# Patient Record
Sex: MALE | Race: WHITE | NOT HISPANIC OR LATINO | Employment: FULL TIME | URBAN - METROPOLITAN AREA
[De-identification: names, ages, dates, MRNs, and addresses within clinical notes are randomized per-mention and may not be internally consistent; named-entity substitution may affect disease eponyms.]

---

## 2017-01-04 ENCOUNTER — OFFICE VISIT (OUTPATIENT)
Dept: URGENT CARE | Facility: PHYSICIAN GROUP | Age: 37
End: 2017-01-04

## 2017-01-04 VITALS
HEIGHT: 69 IN | TEMPERATURE: 98.6 F | HEART RATE: 64 BPM | WEIGHT: 177 LBS | BODY MASS INDEX: 26.22 KG/M2 | RESPIRATION RATE: 18 BRPM | SYSTOLIC BLOOD PRESSURE: 126 MMHG | OXYGEN SATURATION: 96 % | DIASTOLIC BLOOD PRESSURE: 68 MMHG

## 2017-01-04 DIAGNOSIS — Z02.4 DRIVER'S PERMIT PHYSICAL EXAMINATION: ICD-10-CM

## 2017-01-04 PROCEDURE — 99406 BEHAV CHNG SMOKING 3-10 MIN: CPT | Performed by: NURSE PRACTITIONER

## 2017-01-04 PROCEDURE — 7100 PR DOT PHYSICAL: Performed by: NURSE PRACTITIONER

## 2017-01-04 RX ORDER — IBUPROFEN 200 MG
200 TABLET ORAL EVERY 6 HOURS PRN
COMMUNITY
End: 2019-01-28

## 2017-01-04 NOTE — MR AVS SNAPSHOT
"        Virgilio Puenteslin   2017 9:00 AM   Office Visit   MRN: [id number]    Department:  [facility]   Dept Phone:  [phone]    Description:  Male : 1980   Provider:  Cathey J Hamman, A.P.N.           Reason for Visit     Employment Physical DOT      Allergies as of 2017     No Known Allergies      Vital Signs     Blood Pressure Pulse Temperature Respirations Height Weight    126/68 mmHg 64 37 °C (98.6 °F) 18 1.753 m (5' 9.02\") 80.287 kg (177 lb)    Body Mass Index Oxygen Saturation Smoking Status             26.13 kg/m2 96% Current Every Day Smoker         Basic Information     Date Of Birth Sex Race Ethnicity Preferred Language    1980 Male White Non- English      Problem List              ICD-10-CM Priority Class Noted - Resolved    Vitamin D deficiency E55.9   2014 - Present    Fatigue R53.83   2016 - Present    GERD without esophagitis K21.9   2016 - Present    Tobacco dependence F17.200   2016 - Present      Health Maintenance        Date Due Completion Dates    IMM DTaP/Tdap/Td Vaccine (1 - Tdap) 2000 ---    IMM PNEUMOCOCCAL 19-64 (ADULT) MEDIUM RISK SERIES (1 of 1 - PPSV23) 2000 ---    IMM INFLUENZA (1) 2016 ---            Current Immunizations     No immunizations on file.      Below and/or attached are the medications your provider expects you to take. Review all of your home medications and newly ordered medications with your provider and/or pharmacist. Follow medication instructions as directed by your provider and/or pharmacist. Please keep your medication list with you and share with your provider. Update the information when medications are discontinued, doses are changed, or new medications (including over-the-counter products) are added; and carry medication information at all times in the event of emergency situations     Allergies:  No Known Allergies          Medications  Valid as of: 2017 -  9:23 AM    Generic " Name Brand Name Tablet Size Instructions for use    Ibuprofen (Tab) MOTRIN 200 MG Take 200 mg by mouth every 6 hours as needed.        Pantoprazole Sodium (Tablet Delayed Response) PROTONIX 20 MG Take 1 Tab by mouth every day.        .                 Medicines prescribed today were sent to:     [facility] [address] [place] [address], [place] [address] [facility] [address]    [address] [place] [address]    Phone: [phone] Fax: [phone]    Open 24 Hours?: No      Medication refill instructions:       If your prescription bottle indicates you have medication refills left, it is not necessary to call your provider’s office. Please contact your pharmacy and they will refill your medication.    If your prescription bottle indicates you do not have any refills left, you may request refills at any time through one of the following ways: The online [employer] system (except Urgent Care), by calling your provider’s office, or by asking your pharmacy to contact your provider’s office with a refill request. Medication refills are processed only during regular business hours and may not be available until the next business day. Your provider may request additional information or to have a follow-up visit with you prior to refilling your medication.   *Please Note: Medication refills are assigned a new Rx number when refilled electronically. Your pharmacy may indicate that no refills were authorized even though a new prescription for the same medication is available at the pharmacy. Please request the medicine by name with the pharmacy before contacting your provider for a refill.           MyChart Status: Patient Declined

## 2017-01-04 NOTE — PROGRESS NOTES
Patient presents for DOT physical. Exam is within normal limits, please see form scanned into the patient's chart. Patient smokes 1 PPD of cigarettes. I counseled him on smoking cessation , duration 4 minutes. Advised of risks and hazards of smoking, discussed smoking cessation program at [employer]. Patient states that his wife works at [employer] and that he is planning to do the smoking cessation program at some point but is not ready at this time.

## 2017-02-03 ENCOUNTER — HOSPITAL ENCOUNTER (OUTPATIENT)
Dept: RADIOLOGY | Facility: MEDICAL CENTER | Age: 37
End: 2017-02-03
Attending: NURSE PRACTITIONER
Payer: COMMERCIAL

## 2017-02-03 ENCOUNTER — OCCUPATIONAL MEDICINE (OUTPATIENT)
Dept: URGENT CARE | Facility: PHYSICIAN GROUP | Age: 37
End: 2017-02-03
Payer: COMMERCIAL

## 2017-02-03 VITALS
SYSTOLIC BLOOD PRESSURE: 130 MMHG | RESPIRATION RATE: 12 BRPM | TEMPERATURE: 100.2 F | HEIGHT: 69 IN | OXYGEN SATURATION: 97 % | HEART RATE: 62 BPM | DIASTOLIC BLOOD PRESSURE: 88 MMHG | BODY MASS INDEX: 26.51 KG/M2 | WEIGHT: 179 LBS
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
## 2017-02-03 DIAGNOSIS — S90.32XA CONTUSION OF LEFT FOOT, INITIAL ENCOUNTER: Primary | ICD-10-CM

## 2017-02-03 DIAGNOSIS — S99.922A INJURY OF FOOT, LEFT, INITIAL ENCOUNTER: ICD-10-CM

## 2017-02-03 PROCEDURE — 73630 X-RAY EXAM OF FOOT: CPT | Mod: LT

## 2017-02-03 PROCEDURE — 99214 OFFICE O/P EST MOD 30 MIN: CPT | Mod: 29 | Performed by: NURSE PRACTITIONER

## 2017-02-03 ASSESSMENT — ENCOUNTER SYMPTOMS
SENSORY CHANGE: 0
BACK PAIN: 0
FALLS: 0
NAUSEA: 0
TINGLING: 0

## 2017-02-03 ASSESSMENT — LIFESTYLE VARIABLES: SUBSTANCE_ABUSE: 0

## 2017-02-03 ASSESSMENT — PAIN SCALES - GENERAL: PAINLEVEL: 6=MODERATE PAIN

## 2017-02-03 NOTE — MR AVS SNAPSHOT
"        Virgilio Hernandez   2/3/2017 9:30 AM   Occupational Medicine   MRN: [id number]    Department:  [facility]   Dept Phone:  [phone]    Description:  Male : 1980   Provider:  LE Curran           Reason for Visit     Foot Problem x today / LT foot / poss broken       Allergies as of 2/3/2017     No Known Allergies      You were diagnosed with     Injury of foot, left, initial encounter   [732959]       Work related injury   [614092]       Contusion of left foot, initial encounter   [557679]         Vital Signs     Blood Pressure Pulse Temperature Respirations Height Weight    130/88 mmHg 62 37.9 °C (100.2 °F) 12 1.753 m (5' 9\") 81.194 kg (179 lb)    Body Mass Index Oxygen Saturation Smoking Status             26.42 kg/m2 97% Current Every Day Smoker         Basic Information     Date Of Birth Sex Race Ethnicity Preferred Language    1980 Male White Non- English      Problem List              ICD-10-CM Priority Class Noted - Resolved    Vitamin D deficiency E55.9   2014 - Present    Fatigue R53.83   2016 - Present    GERD without esophagitis K21.9   2016 - Present    Tobacco dependence F17.200   2016 - Present      Health Maintenance        Date Due Completion Dates    IMM DTaP/Tdap/Td Vaccine (1 - Tdap) 2000 ---    IMM PNEUMOCOCCAL 19-64 (ADULT) MEDIUM RISK SERIES (1 of 1 - PPSV23) 2000 ---    IMM INFLUENZA (1) 2016 ---            Current Immunizations     No immunizations on file.      Below and/or attached are the medications your provider expects you to take. Review all of your home medications and newly ordered medications with your provider and/or pharmacist. Follow medication instructions as directed by your provider and/or pharmacist. Please keep your medication list with you and share with your provider. Update the information when medications are discontinued, doses are changed, or new medications (including " over-the-counter products) are added; and carry medication information at all times in the event of emergency situations     Allergies:  No Known Allergies          Medications  Valid as of: February 03, 2017 - 11:32 AM    Generic Name Brand Name Tablet Size Instructions for use    Ibuprofen (Tab) MOTRIN 200 MG Take 200 mg by mouth every 6 hours as needed.        Pantoprazole Sodium (Tablet Delayed Response) PROTONIX 20 MG Take 1 Tab by mouth every day.        .                 Medicines prescribed today were sent to:     [facility] [address] [place] [address], [place] - [address] AT [facility] [address]    [address] [place] [address]    Phone: [phone] Fax: [phone]    Open 24 Hours?: No      Medication refill instructions:       If your prescription bottle indicates you have medication refills left, it is not necessary to call your provider’s office. Please contact your pharmacy and they will refill your medication.    If your prescription bottle indicates you do not have any refills left, you may request refills at any time through one of the following ways: The online bttn system (except Urgent Care), by calling your provider’s office, or by asking your pharmacy to contact your provider’s office with a refill request. Medication refills are processed only during regular business hours and may not be available until the next business day. Your provider may request additional information or to have a follow-up visit with you prior to refilling your medication.   *Please Note: Medication refills are assigned a new Rx number when refilled electronically. Your pharmacy may indicate that no refills were authorized even though a new prescription for the same medication is available at the pharmacy. Please request the medicine by name with the pharmacy before contacting your provider for a refill.           MyChart Status: Patient Declined

## 2017-02-03 NOTE — Clinical Note
"   [facility] [address] [address]  Phone: [phone] - Fax: [phone]        [facility] Progress Report and Disability Certification  Date of Service: 2/3/2017   No Show:  No  Date / Time of Next Visit: 2/10/2017   Claim Information   Patient Name: Virgilio Hernandez  Claim Number:     Employer: [employer]  Date of Injury: 2/3/2017     Insurer / TPA: [employer]  ID / SSN:     Occupation:   Diagnosis: Diagnoses of Injury of foot, left, initial encounter, Work related injury, and Contusion of left foot, initial encounter were pertinent to this visit.    Medical Information   Related to Industrial Injury? Yes    Subjective Complaints:  DOI today at 08:35 am. Welding lifting eyes on cheek plate. When he was getting into possition the plate fell ( 10 inch steel plate, approx 250 pounts) onto the top of his steel toe boot. Missed the steel part and landed on his foot just above the steel.  Severe pain left foot. Pain 6/10 at rest. He has to walk on his heel but pain is severe when he tries to walk. No treatments tried except left his boot in place and reported injury.  Sole employer is \" [employer]\" .    Objective Findings: Physical Exam   Constitutional: He is oriented to person, place, and time. He appears well-developed and well-nourished.   HENT:   Head: Normocephalic and atraumatic.   Neck: Normal range of motion.   Cardiovascular: Normal rate.    Pulmonary/Chest: Effort normal.   Musculoskeletal:        Left foot: There is decreased range of motion, tenderness, bony tenderness ( dorsal and plantar )  and swelling. There is normal capillary refill, no crepitus, no gross deformity and no laceration. Unable to bear weight.        Feet:    Neurological: He is alert and oriented to person, place, and time.   Skin: Skin is warm and dry.   Psychiatric: He has a normal mood and affect. His behavior is normal. Judgment and thought " content normal.   Nursing note and vitals reviewed.     Pre-Existing Condition(s): denies   Assessment:   Initial Visit    Status: Additional Care Required  Permanent Disability:No    Plan: MedicationMedication (NOT at Work)    Diagnostics: X-ray    Comments:       Disability Information   Status: Released to Restricted Duty    From:  2/3/2017  Through: 2/10/2017 Restrictions are: Temporary   Physical Restrictions   Sitting:    Standing:    Stooping:    Bending:      Squatting:    Walking:  < or = to 2 hrs/day  Comments:use of crutches prn [place] hrs/day Pushing:      Pulling:    Other:    Reaching Above Shoulder (L):   Reaching Above Shoulder (R):       Reaching Below Shoulder (L):    Reaching Below Shoulder (R):      Not to exceed Weight Limits   Carrying(hrs):   Weight Limit(lb):   Lifting(hrs):   Weight  Limit(lb):     Comments:      Repetitive Actions   Hands: i.e. Fine Manipulations from Grasping:     Feet: i.e. Operating Foot Controls:     Driving / Operate Machinery:     Physician Name: MAHSA CurranPMARBELLA Physician Signature: SLADE Fan e-Signature: Dr. Jeronimo Villegas, Medical Director   Clinic Name / Location: [address] [address] Clinic Phone Number: Dept: [phone]   Appointment Time: 9:30 Am Visit Start Time: 9:44 AM   Check-In Time:  9:38 Am Visit Discharge Time:  11:29AM   Original-Treating Physician or Chiropractor    Page 2-Insurer/TPA    Page 3-Employer    Page 4-Employee

## 2017-02-03 NOTE — PROGRESS NOTES
"Subjective:      Virgilio Hernandez is a 36 y.o. male who presents with Foot Problem         Denies past medical, surgical or family history that is significant to today's problem.   RX or OTC medications reviewed with patient today.   No Known Allergies      HPI DOI today at 08:35 am. Welding lifting eyes on cheek plate. When he was getting into possition the plate fell ( 10 inch steel plate, approx 250 pounts) onto the top of his steel toe boot. Missed the steel part and landed on his foot just above the steel.  Severe pain left foot. Pain 6/10 at rest. He has to walk on his heel but pain is severe when he tries to walk. No treatments tried except left his boot in place and reported injury.  Sole employer is \" [employer]\" .     Review of Systems   Gastrointestinal: Negative for nausea.   Musculoskeletal: Negative for back pain and falls.   Neurological: Negative for tingling and sensory change.   Psychiatric/Behavioral: Negative for substance abuse.          Objective:     /88 mmHg  Pulse 62  Temp(Src) 37.9 °C (100.2 °F)  Resp 12  Ht 1.753 m (5' 9\")  Wt 81.194 kg (179 lb)  BMI 26.42 kg/m2  SpO2 97%     Physical Exam   Constitutional: He is oriented to person, place, and time. He appears well-developed and well-nourished.   HENT:   Head: Normocephalic and atraumatic.   Neck: Normal range of motion.   Cardiovascular: Normal rate.    Pulmonary/Chest: Effort normal.   Musculoskeletal:        Left foot: There is decreased range of motion, tenderness, bony tenderness and swelling. There is normal capillary refill, no crepitus, no deformity and no laceration.        Feet:    Neurological: He is alert and oriented to person, place, and time.   Skin: Skin is warm and dry.   Psychiatric: He has a normal mood and affect. His behavior is normal. Judgment and thought content normal.   Nursing note and vitals reviewed.         2/3/2017 10:31 AM    HISTORY/REASON FOR EXAM:  Injury to left " foot      TECHNIQUE/EXAM DESCRIPTION AND NUMBER OF VIEWS:  3 nonweightbearing views of the LEFT foot.    COMPARISON:  No comparison available    FINDINGS:  Bone mineralization is normal.  There is no evidence of fracture or dislocation.  Soft tissues are normal.         Impression        No evidence of fracture or dislocation.         Reading Provider Reading Date     Forrest Horton M.D. Feb 3, 2017          Assessment/Plan:     1. Injury of foot, left, initial encounter  CANCELED: DX-FOOT-2- LEFT   2. Work related injury  CANCELED: DX-FOOT-2- LEFT   3. Contusion of left foot, initial encounter       See [place] D39 and C4

## 2017-02-03 NOTE — Clinical Note
"EMPLOYEE’S CLAIM FOR COMPENSATION/ REPORT OF INITIAL TREATMENT  FORM C-4    EMPLOYEE’S CLAIM - PROVIDE ALL INFORMATION REQUESTED   First Name  Virgilio Melissa Last Name  David Birthdate                    1980                Sex  male Claim Number   Home Address  [address] Age  36 y.o. Height  1.753 m (5' 9\") Weight  81.194 kg (179 lb) [facility]     [place]  [address] Telephone  [phone] (home) [phone] (work)   Mailing Address  [address] [place]  [address] Primary Language Spoken  English    Insurer  [employer]  Third Party   [employer]   Employee's Occupation (Job Title) When Injury or Occupational Disease Occurred      Employer's Name    [employer] Telephone   [phone]   Employer Address   [address] Zip   [address]   Date of Injury  2/3/2017               Hour of Injury  8:35 AM Date Employer Notified  2/3/2017 Last Day of Work after Injury or Occupational Disease  2/3/2017 Supervisor to Whom Injury Reported  Giacomo Jauregui/Masoud Miller   Address or Location of Accident (if applicable)  [place]   What were you doing at the time of accident? (if applicable)  Welding Lifting eyes on check plate    How did this injury or occupational disease occur? (Be specific an answer in detail. 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   Witnesses to the Accident  giacomo jauregui       Nature of Injury or Occupational Disease  Workers' Compensation  Part(s) of Body Injured or Affected  Foot (L), ,     I certify that the above is true and correct to the best " of my knowledge and that I have provided this information in order to obtain the benefits of [place]  I hereby authorize any physician, chiropractor, surgeon, practitioner, or other person, any hospital, including [facility] or [facility], any medical service organization, any insurance company, or other institution or organization to release to each other, any medical or other information, including benefits paid or payable, pertinent to this injury or disease, except information relative to diagnosis, treatment and/or counseling for AIDS, psychological conditions, alcohol or controlled substances, for which I must give specific authorization.  A Photostat of this authorization shall be as valid as the original.     Date   Place   Employee’s Signature   THIS REPORT MUST BE COMPLETED AND MAILED WITHIN 3 WORKING DAYS OF TREATMENT   Place  [facility]  Name of Facility  [place]   Date  2/3/2017 Diagnosis  (S99.922A) Injury of foot, left, initial encounter  (Y99.0) Work related injury  (S90.32XA) Contusion of left foot, initial encounter Is there evidence the injured employee was under the influence of alcohol and/or another controlled substance at the time of accident?   Hour  9:44 AM Description of Injury or Disease  Diagnoses of Injury of foot, left, initial encounter, Work related injury, and Contusion of left foot, initial encounter were pertinent to this visit. No   Treatment  OTC analgesic of choice, ice, rest, elevation, ACE wrap, Crutches  Have you advised the patient to remain off work five days or more? No   X-Ray Findings  Negative   If Yes   From Date  To Date      From information given by the employee, together with medical evidence, can you directly connect this injury or occupational disease as job incurred?  Yes If No Full Duty  No Modified Duty  Yes   Is  "additional medical care by a physician indicated?  Yes If Modified Duty, Specify any Limitations / Restrictions  See [place] D39   Do you know of any previous injury or disease contributing to this condition or occupational disease?                            No   Date  2/3/2017 Print Doctor’s Name LE Curran I certify the employer’s copy of  this form was mailed on:   Address  [address] Insurer’s Use Only     [facility] [facility] [place] [facility] Zip  [address]    Provider’s Tax ID Number  [id number]  Telephone  Dept: [phone]        [id number] Morfin   e-Signature: Dr. Jeronimo Villegas, Medical Director Degree  [employer]        ORIGINAL-TREATING PHYSICIAN OR CHIROPRACTOR    PAGE 2-INSURER/TPA    PAGE 3-EMPLOYER    PAGE 4-EMPLOYEE             Form C-4 (rev10/07)              BRIEF DESCRIPTION OF RIGHTS AND BENEFITS  (Pursuant to [place]RS 616C.050)    Notice of Injury or Occupational Disease (Incident Report Form C-1): If an injury or occupational disease (OD) arises out of and in the  course of employment, you must provide written notice to your employer as soon as practicable, but no later than 7 days after the accident or  OD. Your employer shall maintain a sufficient supply of the required forms.    Claim for Compensation (Form C-4): If medical treatment is sought, the form C-4 is available at the place of initial treatment. A completed  \"Claim for Compensation\" (Form C-4) must be filed within 90 days after an accident or OD. The treating physician or chiropractor must,  within 3 working days after treatment, complete and mail to the employer, the employer's insurer and third-party , the Claim for  Compensation.    Medical Treatment: If you require medical treatment for your on-the-job injury or OD, you may be required to select a physician or  chiropractor from a list provided by your workers’ compensation insurer, if it has contracted with an Organization for " Managed Care (MCO) or  Preferred Provider Organization (PPO) or providers of health care. If your employer has not entered into a contract with an MCO or PPO, you  may select a physician or chiropractor from the Panel of Physicians and Chiropractors. Any medical costs related to your industrial injury or  OD will be paid by your insurer.    Temporary Total Disability (TTD): If your doctor has certified that you are unable to work for a period of at least 5 consecutive days, or 5  cumulative days in a 20-day period, or places restrictions on you that your employer does not accommodate, you may be entitled to TTD  compensation.    Temporary Partial Disability (TPD): If the wage you receive upon reemployment is less than the compensation for TTD to which you are  entitled, the insurer may be required to pay you TPD compensation to make up the difference. TPD can only be paid for a maximum of 24  months.    Permanent Partial Disability (PPD): When your medical condition is stable and there is an indication of a PPD as a result of your injury or  OD, within 30 days, your insurer must arrange for an evaluation by a rating physician or chiropractor to determine the degree of your PPD. The  amount of your PPD award depends on the date of injury, the results of the PPD evaluation and your age and wage.    Permanent Total Disability (PTD): If you are medically certified by a treating physician or chiropractor as permanently and totally disabled  and have been granted a PTD status by your insurer, you are entitled to receive monthly benefits not to exceed 66 2/3% of your average  monthly wage. The amount of your PTD payments is subject to reduction if you previously received a PPD award.    Vocational Rehabilitation Services: You may be eligible for vocational rehabilitation services if you are unable to return to the job due to a  permanent physical impairment or permanent restrictions as a result of your injury or  occupational disease.    Transportation and Per Linus Reimbursement: You may be eligible for travel expenses and per linus associated with medical treatment.    Reopening: You may be able to reopen your claim if your condition worsens after claim closure.    Appeal Process: If you disagree with a written determination issued by the insurer or the insurer does not respond to your request, you may  appeal to the Department of Administration, , by following the instructions contained in your determination letter. You must  appeal the determination within 70 days from the date of the determination letter at [address], [address], [place], [place]  [address], or [address], [place], [place] [address]. If you disagree with the  decision, you may appeal to the  Department of Administration, . You must file your appeal within 30 days from the date of the  decision  letter at [address], [address], [place], [place] [address], or [address], [place], [place] [address]. If you  disagree with a decision of an , you may file a petition for judicial review with the District Court. You must do so within 30  days of the Appeal Officer’s decision. You may be represented by an  at your own expense or you may contact the [facility] for possible  representation.    [place]  for Injured Workers (NAIW): If you disagree with a  decision, you may request that NAIW represent you  without charge at an  Hearing. For information regarding denial of benefits, you may contact the [facility] at: [address], [address], [place] [address], [phone], or [address], [place] [address], [phone]    To File a Complaint with the Division: If you wish to file a complaint with the  of the Division of Industrial Relations  (DIR),  please contact the Workers’ Compensation Section, [address], [address], [place], [place] [address], telephone [phone], or  [address], [address], [place], [place] [address], telephone [phone].    For assistance with Workers’ Compensation Issues: you may contact the Office of the Health system Consumer Health Assistance, [address] [place] [address], [address], [place], [place] [address], Toll Free 1-959.953.9611, Web site: http://govcha.Replaced by [facility].[place]., E-mail  [email or web address].Replaced by [facility].nv.                                                                                                                                                                                                                                   __________________________________________________________________                                                                   _________________                Employee Name / Signature                                                                                                                                                       Date                                                                                                                                                                                                     D-2 (rev. 10/07)

## 2017-02-10 ENCOUNTER — OCCUPATIONAL MEDICINE (OUTPATIENT)
Dept: URGENT CARE | Facility: PHYSICIAN GROUP | Age: 37
End: 2017-02-10
Payer: COMMERCIAL

## 2017-02-10 VITALS
HEART RATE: 74 BPM | BODY MASS INDEX: 25.62 KG/M2 | HEIGHT: 69 IN | WEIGHT: 173 LBS | DIASTOLIC BLOOD PRESSURE: 70 MMHG | OXYGEN SATURATION: 97 % | SYSTOLIC BLOOD PRESSURE: 124 MMHG | RESPIRATION RATE: 16 BRPM | TEMPERATURE: 98.4 F

## 2017-02-10 DIAGNOSIS — S97.82XD CRUSH INJURY OF LEFT FOOT, SUBSEQUENT ENCOUNTER: ICD-10-CM

## 2017-02-10 DIAGNOSIS — S90.122D CONTUSION OF LEFT FOOT INCLUDING TOES, SUBSEQUENT ENCOUNTER: Primary | ICD-10-CM

## 2017-02-10 DIAGNOSIS — S90.32XD CONTUSION OF LEFT FOOT INCLUDING TOES, SUBSEQUENT ENCOUNTER: Primary | ICD-10-CM

## 2017-02-10 PROCEDURE — 99214 OFFICE O/P EST MOD 30 MIN: CPT | Mod: 29 | Performed by: PHYSICIAN ASSISTANT

## 2017-02-10 NOTE — PROGRESS NOTES
"Subjective:      Virgilio Hernandez is a 36 y.o. male who presents with Follow-Up    Pt PMH, SocHx, SurgHx, FamHx, Drug allergies and medications reviewed with pt/EPIC.      Family history reviewed, it is not pertinent to this complaint.       DOI: 02/03/2017.  PT presents today for follow up for crush injury to left foot while at work. PT states a steel plate fell across is forefoot while wearing his steel toed boots.  PT states swelling has improved as has the pain, but still very sore and tender to to touch. Pt states he has been using RICE treatment and OTC ibuprofen with some relief.       HPI Comments: Patient presents with: work comp follow up:  Follow-Up: Left foot swelling has gone down and able to move toes but they are .          Review of Systems   Musculoskeletal:        Foot      All other systems reviewed and are negative.         Objective:     /70 mmHg  Pulse 74  Temp(Src) 36.9 °C (98.4 °F)  Resp 16  Ht 1.753 m (5' 9.02\")  Wt 78.472 kg (173 lb)  BMI 25.54 kg/m2  SpO2 97%     Physical Exam   Constitutional: He is oriented to person, place, and time. He appears well-developed and well-nourished. No distress.   HENT:   Head: Normocephalic and atraumatic.   Eyes: EOM are normal. Pupils are equal, round, and reactive to light.   Neck: Normal range of motion. Neck supple. No JVD present.   Cardiovascular: Normal rate.    Pulmonary/Chest: Effort normal.   Abdominal: Soft.   Musculoskeletal:        Left foot: There is decreased range of motion, tenderness, bony tenderness and swelling. There is normal capillary refill, no crepitus and no deformity.        Feet:    Lymphadenopathy:     He has no cervical adenopathy.   Neurological: He is alert and oriented to person, place, and time.   Skin: Skin is warm and dry.       Left foot exam reveals mild swelling to dorsum of foot with diffuse ecchymosis to toes and forefoot.  PT sensation is intact, can move toes with significant discomfort " and is very tender to palpation of forefoot and 1-3 toes.  Cap refill is brisk, skin is warm and pink.         Assessment/Plan:     1. Contusion of left foot including toes, subsequent encounter     2. Crush injury of left foot, subsequent encounter       PT to follow D-39     Continue RICE treatment.

## 2017-02-10 NOTE — MR AVS SNAPSHOT
"        Virgilio Hernandez   2/10/2017 8:00 AM   Occupational Medicine   MRN: [id number]    Department:  [facility]   Dept Phone:  [phone]    Description:  Male : 1980   Provider:  Pat Mccallum PA-C           Reason for Visit     Follow-Up Left foot swelling has gone down and able to move toes but they are .      Allergies as of 2/10/2017     No Known Allergies      You were diagnosed with     Contusion of left foot including toes, subsequent encounter   [[id number]]  -  Primary     Crush injury of left foot, subsequent encounter   [[id number]]         Vital Signs     Blood Pressure Pulse Temperature Respirations Height Weight    124/70 mmHg 74 36.9 °C (98.4 °F) 16 1.753 m (5' 9.02\") 78.472 kg (173 lb)    Body Mass Index Oxygen Saturation Smoking Status             25.54 kg/m2 97% Current Every Day Smoker         Basic Information     Date Of Birth Sex Race Ethnicity Preferred Language    1980 Male White Non- English      Problem List              ICD-10-CM Priority Class Noted - Resolved    Vitamin D deficiency E55.9   2014 - Present    Fatigue R53.83   2016 - Present    GERD without esophagitis K21.9   2016 - Present    Tobacco dependence F17.200   2016 - Present      Health Maintenance        Date Due Completion Dates    IMM DTaP/Tdap/Td Vaccine (1 - Tdap) 2000 ---    IMM PNEUMOCOCCAL 19-64 (ADULT) MEDIUM RISK SERIES (1 of 1 - PPSV23) 2000 ---    IMM INFLUENZA (1) 2016 ---            Current Immunizations     No immunizations on file.      Below and/or attached are the medications your provider expects you to take. Review all of your home medications and newly ordered medications with your provider and/or pharmacist. Follow medication instructions as directed by your provider and/or pharmacist. Please keep your medication list with you and share with your provider. Update the information when medications are discontinued, doses are " changed, or new medications (including over-the-counter products) are added; and carry medication information at all times in the event of emergency situations     Allergies:  No Known Allergies          Medications  Valid as of: February 10, 2017 -  8:33 AM    Generic Name Brand Name Tablet Size Instructions for use    Ibuprofen (Tab) MOTRIN 200 MG Take 200 mg by mouth every 6 hours as needed.        Pantoprazole Sodium (Tablet Delayed Response) PROTONIX 20 MG Take 1 Tab by mouth every day.        .                 Medicines prescribed today were sent to:     [facility] [place] - [address] AT [facility] [address] [place]    [address] [place] [address]    Phone: [phone] Fax: [phone]    Open 24 Hours?: No      Medication refill instructions:       If your prescription bottle indicates you have medication refills left, it is not necessary to call your provider’s office. Please contact your pharmacy and they will refill your medication.    If your prescription bottle indicates you do not have any refills left, you may request refills at any time through one of the following ways: The online [employer] system (except Urgent Care), by calling your provider’s office, or by asking your pharmacy to contact your provider’s office with a refill request. Medication refills are processed only during regular business hours and may not be available until the next business day. Your provider may request additional information or to have a follow-up visit with you prior to refilling your medication.   *Please Note: Medication refills are assigned a new Rx number when refilled electronically. Your pharmacy may indicate that no refills were authorized even though a new prescription for the same medication is available at the pharmacy. Please request the medicine by name with the pharmacy before contacting your provider for a refill.           MyChart Status: Patient Declined

## 2017-02-10 NOTE — Clinical Note
[facility] [address] [place] [address]  Phone: [phone] - Fax: [phone]        [facility] Progress Report and Disability Certification  Date of Service: 2/10/2017   No Show:  No  Date / Time of Next Visit: 2/17/2017 8:00 AM   Claim Information   Patient Name: Virgilio Hernandez  Claim Number:     Employer:[employer] Date of Injury: 2/3/2017     Insurer / TPA: [employer]  ID / SSN:     Occupation:   Diagnosis: The primary encounter diagnosis was Contusion of left foot including toes, subsequent encounter. A diagnosis of Crush injury of left foot, subsequent encounter was also pertinent to this visit.    Medical Information   Related to Industrial Injury? Yes    Subjective Complaints:  DOI: 02/03/2017.  PT presents today for follow up for crush injury to left foot while at work. PT states a steel plate fell across is forefoot while wearing his steel toed boots.  PT states swelling has improved as has the pain, but still very sore and tender to to touch. Pt states he has been using RICE treatment and OTC ibuprofen with some relief.     Objective Findings: Left foot exam reveals mild swelling to dorsum of foot with diffuse ecchymosis to toes and forefoot.  PT sensation is intact, can move toes with significant discomfort and is very tender to palpation of forefoot and 1-3 toes.  Cap refill is brisk, skin is warm and pink.     Pre-Existing Condition(s):     Assessment:   Condition Improved    Status: Additional Care Required  Permanent Disability:No    Plan:   Comments:RICE treatment. OTC ibuprofen TID, add Tylenol TID between doses of IBu.      Diagnostics:      Comments:       Disability Information   Status: Released to Restricted Duty    From:  2/10/2017  Through: 2/17/2017 Restrictions are: Temporary   Physical Restrictions   Sitting:    Standing:  < or = to 6 hrs/day Stooping:    Bending:      Squatting:    Walking:   Climbin hrs/day Pushing:      Pulling:    Other:    Reaching Above Shoulder (L):   Reaching Above Shoulder (R):       Reaching Below Shoulder (L):    Reaching Below Shoulder (R):      Not to exceed Weight Limits   Carrying(hrs):   Weight Limit(lb):   Lifting(hrs):   Weight  Limit(lb):     Comments: Limit squatting as much as possible.      Repetitive Actions   Hands: i.e. Fine Manipulations from Grasping:     Feet: i.e. Operating Foot Controls:     Driving / Operate Machinery:     Physician Name: Blu Mccallum PA-C Physician Signature: BLU Salinas PA-C e-Signature: Dr. Jeronimo Villegas, Medical Director   Clinic Name / Location: [address] [address] Clinic Phone Number: Dept: [phone]   Appointment Time: 8:00 Am Visit Start Time: 8:05 AM   Check-In Time:  7:37 Am Visit Discharge Time: 8:37 AM   Original-Treating Physician or Chiropractor    Page 2-Insurer/TPA    Page 3-Employer    Page 4-Employee

## 2017-02-17 ENCOUNTER — OCCUPATIONAL MEDICINE (OUTPATIENT)
Dept: URGENT CARE | Facility: PHYSICIAN GROUP | Age: 37
End: 2017-02-17
Payer: COMMERCIAL

## 2017-02-17 VITALS
HEART RATE: 64 BPM | WEIGHT: 170 LBS | HEIGHT: 69 IN | DIASTOLIC BLOOD PRESSURE: 78 MMHG | OXYGEN SATURATION: 99 % | RESPIRATION RATE: 14 BRPM | SYSTOLIC BLOOD PRESSURE: 116 MMHG | TEMPERATURE: 99.3 F | BODY MASS INDEX: 25.18 KG/M2

## 2017-02-17 DIAGNOSIS — S90.32XD CONTUSION OF LEFT FOOT, SUBSEQUENT ENCOUNTER: ICD-10-CM

## 2017-02-17 DIAGNOSIS — S97.82XD CRUSH INJURY OF LEFT FOOT, SUBSEQUENT ENCOUNTER: Primary | ICD-10-CM

## 2017-02-17 PROCEDURE — 99214 OFFICE O/P EST MOD 30 MIN: CPT | Mod: 29 | Performed by: PHYSICIAN ASSISTANT

## 2017-02-17 NOTE — PROGRESS NOTES
"Subjective:      Virgilio Hernandez is a 36 y.o. male who presents with Follow-Up    Pt PMH, SocHx, SurgHx, FamHx, Drug allergies and medications reviewed with pt/[facility].      Family history reviewed, it is not pertinent to this complaint.     DOI: 02/03/2017.  PT presents today for follow up of crush injury to left foot that occurred while at work.  PT states his foot has improved significantly, though still sensitive to certain movements and positions, he feels he is ready to be released back to work without restrictions.       HPI Comments: Patient presents with:  Follow-Up:  2/3/2017 left foot injury.  \"Ready to be released\"          Review of Systems   Musculoskeletal:        Foot injury     All other systems reviewed and are negative.         Objective:     /78 mmHg  Pulse 64  Temp(Src) 37.4 °C (99.3 °F)  Resp 14  Ht 1.753 m (5' 9.02\")  Wt 77.111 kg (170 lb)  BMI 25.09 kg/m2  SpO2 99%     Physical Exam   Constitutional: He is oriented to person, place, and time. He appears well-developed and well-nourished. No distress.   HENT:   Head: Normocephalic and atraumatic.   Eyes: EOM are normal. Pupils are equal, round, and reactive to light.   Neck: Normal range of motion. Neck supple. No JVD present.   Cardiovascular: Normal rate and intact distal pulses.    Pulmonary/Chest: Effort normal.   Abdominal: Soft.   Lymphadenopathy:     He has no cervical adenopathy.   Neurological: He is alert and oriented to person, place, and time.   Skin: Skin is warm and dry.   Vitals reviewed.      Foot exam: FROM in all planes of movement.  No ecchymosis noted, mild swelling to dorsum of forefoot and toes.  Distal neuro/vascular intact. PT ambulates with normal gait.        Assessment/Plan:     1. Crush injury of left foot, subsequent encounter     2. Contusion of left foot, subsequent encounter       Pt released MMI.    Can continue RICE treatment as needed.    Motrin/Advil/Ibuprophen 600 mg every 6 hours as needed " for pain or swelling.

## 2017-02-17 NOTE — MR AVS SNAPSHOT
"        Virgilio Hernandez   2017 8:00 AM   Occupational Medicine   MRN: [id number]    Department:  [facility]   Dept Phone:  [phone]    Description:  Male : 1980   Provider:  Pat Mccallum PA-C           Reason for Visit     Follow-Up  2/3/2017 left foot injury      Allergies as of 2017     No Known Allergies      You were diagnosed with     Crush injury of left foot, subsequent encounter   [212636]  -  Primary     Contusion of left foot, subsequent encounter   [280240]         Vital Signs     Blood Pressure Pulse Temperature Respirations Height Weight    116/78 mmHg 64 37.4 °C (99.3 °F) 14 1.753 m (5' 9.02\") 77.111 kg (170 lb)    Body Mass Index Oxygen Saturation Smoking Status             25.09 kg/m2 99% Current Every Day Smoker         Basic Information     Date Of Birth Sex Race Ethnicity Preferred Language    1980 Male White Non- English      Problem List              ICD-10-CM Priority Class Noted - Resolved    Vitamin D deficiency E55.9   2014 - Present    Fatigue R53.83   2016 - Present    GERD without esophagitis K21.9   2016 - Present    Tobacco dependence F17.200   2016 - Present      Health Maintenance        Date Due Completion Dates    IMM DTaP/Tdap/Td Vaccine (1 - Tdap) 2000 ---    IMM PNEUMOCOCCAL 19-64 (ADULT) MEDIUM RISK SERIES (1 of 1 - PPSV23) 2000 ---    IMM INFLUENZA (1) 2016 ---            Current Immunizations     No immunizations on file.      Below and/or attached are the medications your provider expects you to take. Review all of your home medications and newly ordered medications with your provider and/or pharmacist. Follow medication instructions as directed by your provider and/or pharmacist. Please keep your medication list with you and share with your provider. Update the information when medications are discontinued, doses are changed, or new medications (including over-the-counter products) are added; " and carry medication information at all times in the event of emergency situations     Allergies:  No Known Allergies          Medications  Valid as of: February 17, 2017 -  8:32 AM    Generic Name Brand Name Tablet Size Instructions for use    Ibuprofen (Tab) MOTRIN 200 MG Take 200 mg by mouth every 6 hours as needed.        Pantoprazole Sodium (Tablet Delayed Response) PROTONIX 20 MG Take 1 Tab by mouth every day.        .                 Medicines prescribed today were sent to:     [facility] [address] [place] [address], [place] - [address] AT [facility] OF [facility]. & [place]    [address] [place] [address]    Phone: [phone] Fax: [phone]    Open 24 Hours?: No      Medication refill instructions:       If your prescription bottle indicates you have medication refills left, it is not necessary to call your provider’s office. Please contact your pharmacy and they will refill your medication.    If your prescription bottle indicates you do not have any refills left, you may request refills at any time through one of the following ways: The online [employer] system (except Urgent Care), by calling your provider’s office, or by asking your pharmacy to contact your provider’s office with a refill request. Medication refills are processed only during regular business hours and may not be available until the next business day. Your provider may request additional information or to have a follow-up visit with you prior to refilling your medication.   *Please Note: Medication refills are assigned a new Rx number when refilled electronically. Your pharmacy may indicate that no refills were authorized even though a new prescription for the same medication is available at the pharmacy. Please request the medicine by name with the pharmacy before contacting your provider for a refill.           MyChart Status: Patient Declined

## 2017-02-17 NOTE — Clinical Note
[facility] [address] [address]  Phone: [phone] - Fax: [phone]        [facility] Progress Report and Disability Certification  Date of Service: 2/17/2017   No Show:  No  Date / Time of Next Visit:     Claim Information   Patient Name: Virgilio Hernandez  Claim Number:     Employer:   [employer]  Date of Injury: 2/3/2017     Insurer / TPA: [employer]  ID / SSN:     Occupation:   Diagnosis: The primary encounter diagnosis was Crush injury of left foot, subsequent encounter. A diagnosis of Contusion of left foot, subsequent encounter was also pertinent to this visit.    Medical Information   Related to Industrial Injury? Yes    Subjective Complaints:  DOI: 02/03/2017.  PT presents today for follow up of crush injury to left foot that occurred while at work.  PT states his foot has improved significantly, though still sensitive to certain movements and positions, he feels he is ready to be released back to work without restrictions.     Objective Findings: Foot exam: FROM in all planes of movement.  No ecchymosis noted, mild swelling to dorsum of forefoot and toes.  Distal neuro/vascular intact. PT ambulates with normal gait.    Pre-Existing Condition(s):     Assessment:   Condition Improved    Status: Discharged /  MMI  Permanent Disability:No    Plan:      Diagnostics:      Comments:       Disability Information   Status: Released to Full Duty    From:     Through:   Restrictions are:     Physical Restrictions   Sitting:    Standing:    Stooping:    Bending:      Squatting:    Walking:    Climbing:    Pushing:      Pulling:    Other:    Reaching Above Shoulder (L):   Reaching Above Shoulder (R):       Reaching Below Shoulder (L):    Reaching Below Shoulder (R):      Not to exceed Weight Limits   Carrying(hrs):   Weight Limit(lb):   Lifting(hrs):   Weight  Limit(lb):     Comments:      Repetitive Actions   Hands: i.e. Fine  Manipulations from Grasping:     Feet: i.e. Operating Foot Controls:     Driving / Operate Machinery:     Physician Name: Blu Mccallum PA-C Physician Signature: BLU Salinas PA-C e-Signature: Dr. Jeronimo Villegas, Medical Director   Clinic Name / Location: [address] [address] Clinic Phone Number: Dept: [phone]   Appointment Time: 8:00 Am Visit Start Time: 8:08 AM   Check-In Time:  7:57 Am Visit Discharge Time:  08:31 AM   Original-Treating Physician or Chiropractor    Page 2-Insurer/TPA    Page 3-Employer    Page 4-Employee

## 2018-12-02 ENCOUNTER — HOSPITAL ENCOUNTER (OUTPATIENT)
Dept: RADIOLOGY | Facility: MEDICAL CENTER | Age: 38
End: 2018-12-02
Attending: PHYSICIAN ASSISTANT
Payer: COMMERCIAL

## 2018-12-02 ENCOUNTER — OFFICE VISIT (OUTPATIENT)
Dept: URGENT CARE | Facility: PHYSICIAN GROUP | Age: 38
End: 2018-12-02
Payer: COMMERCIAL

## 2018-12-02 ENCOUNTER — HOSPITAL ENCOUNTER (OUTPATIENT)
Facility: MEDICAL CENTER | Age: 38
End: 2018-12-02
Attending: PHYSICIAN ASSISTANT
Payer: COMMERCIAL

## 2018-12-02 VITALS
HEART RATE: 57 BPM | HEIGHT: 69 IN | OXYGEN SATURATION: 96 % | DIASTOLIC BLOOD PRESSURE: 70 MMHG | BODY MASS INDEX: 27.55 KG/M2 | WEIGHT: 186 LBS | RESPIRATION RATE: 16 BRPM | SYSTOLIC BLOOD PRESSURE: 102 MMHG | TEMPERATURE: 97.3 F

## 2018-12-02 DIAGNOSIS — R10.9 FLANK PAIN: ICD-10-CM

## 2018-12-02 DIAGNOSIS — N20.1 RIGHT URETERAL STONE: Primary | ICD-10-CM

## 2018-12-02 DIAGNOSIS — R11.2 NON-INTRACTABLE VOMITING WITH NAUSEA, UNSPECIFIED VOMITING TYPE: ICD-10-CM

## 2018-12-02 DIAGNOSIS — R31.29 HEMATURIA, MICROSCOPIC: ICD-10-CM

## 2018-12-02 LAB
AMBIGUOUS DTTM AMBI4: NORMAL
APPEARANCE UR: CLEAR
BILIRUB UR STRIP-MCNC: NEGATIVE MG/DL
COLOR UR AUTO: YELLOW
GLUCOSE UR STRIP.AUTO-MCNC: NEGATIVE MG/DL
KETONES UR STRIP.AUTO-MCNC: NEGATIVE MG/DL
LEUKOCYTE ESTERASE UR QL STRIP.AUTO: NEGATIVE
NITRITE UR QL STRIP.AUTO: NEGATIVE
PH UR STRIP.AUTO: 5.5 [PH] (ref 5–8)
PROT UR QL STRIP: NEGATIVE MG/DL
RBC UR QL AUTO: NORMAL
SIGNIFICANT IND 70042: NORMAL
SITE SITE: NORMAL
SOURCE SOURCE: NORMAL
SP GR UR STRIP.AUTO: 1.02
UROBILINOGEN UR STRIP-MCNC: 0.2 MG/DL

## 2018-12-02 PROCEDURE — 87086 URINE CULTURE/COLONY COUNT: CPT

## 2018-12-02 PROCEDURE — 99214 OFFICE O/P EST MOD 30 MIN: CPT | Performed by: PHYSICIAN ASSISTANT

## 2018-12-02 PROCEDURE — 74176 CT ABD & PELVIS W/O CONTRAST: CPT

## 2018-12-02 PROCEDURE — 81002 URINALYSIS NONAUTO W/O SCOPE: CPT | Performed by: PHYSICIAN ASSISTANT

## 2018-12-02 RX ORDER — ONDANSETRON 4 MG/1
4 TABLET, ORALLY DISINTEGRATING ORAL EVERY 6 HOURS PRN
Qty: 10 TAB | Refills: 1 | Status: SHIPPED | OUTPATIENT
Start: 2018-12-02 | End: 2019-01-28

## 2018-12-02 ASSESSMENT — ENCOUNTER SYMPTOMS
FEVER: 0
FOCAL WEAKNESS: 0
FLANK PAIN: 1
BACK PAIN: 1
TINGLING: 0
CONSTIPATION: 0
NUMBNESS: 0
PARESTHESIAS: 0
VOMITING: 1
LEG PAIN: 0
CHILLS: 1
SENSORY CHANGE: 0
BOWEL INCONTINENCE: 0
DIARRHEA: 0
NAUSEA: 1
ABDOMINAL PAIN: 1

## 2018-12-02 NOTE — PATIENT INSTRUCTIONS
Kidney Stones  Kidney stones (urolithiasis) are solid, rock-like deposits that form inside of the organs that make urine (kidneys). A kidney stone may form in a kidney and move into the bladder, where it can cause intense pain and block the flow of urine. Kidney stones are created when high levels of certain minerals are found in the urine. They are usually passed through urination, but in some cases, medical treatment may be needed to remove them.  What are the causes?  Kidney stones may be caused by:  · A condition in which certain glands produce too much parathyroid hormone (primary hyperparathyroidism), which causes too much calcium buildup in the blood.  · Buildup of uric acid crystals in the bladder (hyperuricosuria). Uric acid is a chemical that the body produces when you eat certain foods. It usually exits the body in the urine.  · Narrowing (stricture) of one or both of the tubes that drain urine from the kidneys to the bladder (ureters).  · A kidney blockage that is present at birth (congenital obstruction).  · Past surgery on the kidney or the ureters, such as gastric bypass surgery.  What increases the risk?  The following factors make you more likely to develop kidney stones:  · Having had a kidney stone in the past.  · Having a family history of kidney stones.  · Not drinking enough water.  · Eating a diet that is high in protein, salt (sodium), or sugar.  · Being overweight or obese.  What are the signs or symptoms?  Symptoms of a kidney stone may include:  · Nausea.  · Vomiting.  · Blood in the urine (hematuria).  · Pain in the side of the abdomen, right below the ribs (flank pain). Pain usually spreads (radiates) to the groin.  · Needing to urinate frequently or urgently.  How is this diagnosed?  This condition may be diagnosed based on:  · Your medical history.  · A physical exam.  · Blood tests.  · Urine tests.  · CT scan.  · Abdominal X-ray.  · A procedure to examine the inside of the bladder  (cystoscopy).  How is this treated?  Treatment for kidney stones depends on the size, location, and makeup of the stones. Treatment may involve:  · Analyzing your urine before and after you pass the stone through urination.  · Being monitored at the hospital until you pass the stone through urination.  · Increasing your fluid intake and decreasing the amount of calcium and protein in your diet.  · A procedure to break up kidney stones in the bladder using:  ¨ A focused beam of light (laser therapy).  ¨ Shock waves (extracorporeal shock wave lithotripsy).  · Surgery to remove kidney stones. This may be needed if you have severe pain or have stones that block your urinary tract.  Follow these instructions at home:  Eating and drinking  · Drink enough fluid to keep your urine clear or pale yellow. This will help you to pass the kidney stone.  · If directed, change your diet. This may include:  ¨ Limiting how much sodium you eat.  ¨ Eating more fruits and vegetables.  ¨ Limiting how much meat, poultry, fish, and eggs you eat.  · Follow instructions from your health care provider about eating or drinking restrictions.  General instructions  · Collect urine samples as told by your health care provider. You may need to collect a urine sample:  ¨ 24 hours after you pass the stone.  ¨ 8-12 weeks after passing the kidney stone, and every 6-12 months after that.  · Strain your urine every time you urinate, for as long as directed. Use the strainer that your health care provider recommends.  · Do not throw out the kidney stone after passing it. Keep the stone so it can be tested by your health care provider. Testing the makeup of your kidney stone may help prevent you from getting kidney stones in the future.  · Take over-the-counter and prescription medicines only as told by your health care provider.  · Keep all follow-up visits as told by your health care provider. This is important. You may need follow-up X-rays or  ultrasounds to make sure that your stone has passed.  How is this prevented?  To prevent another kidney stone:  · Drink enough fluid to keep your urine clear or pale yellow. This is the best way to prevent kidney stones.  · Eat a healthy diet and follow recommendations from your health care provider about foods to avoid. You may be instructed to eat a low-protein diet. Recommendations vary depending on the type of kidney stone that you have.  · Maintain a healthy weight.  Contact a health care provider if:  · You have pain that gets worse or does not get better with medicine.  Get help right away if:  · You have a fever or chills.  · You develop severe pain.  · You develop new abdominal pain.  · You faint.  · You are unable to urinate.  This information is not intended to replace advice given to you by your health care provider. Make sure you discuss any questions you have with your health care provider.  Document Released: 12/18/2006 Document Revised: 07/07/2017 Document Reviewed: 06/02/2017  [employer] Interactive Patient Education © 2017 [employer]

## 2018-12-02 NOTE — PROGRESS NOTES
Subjective:      Virgilio Hernandez is a 38 y.o. male who presents with Back Pain (Mid back pain radiating to mid abdomen, vomiting, body aches  x1week)    PMH:  has a past medical history of GERD without esophagitis (2/24/2016).    MEDS:   Current Outpatient Prescriptions:   •  ibuprofen (MOTRIN) 200 MG Tab, Take 200 mg by mouth every 6 hours as needed., Disp: , Rfl:   •  pantoprazole (PROTONIX) 20 MG tablet, Take 1 Tab by mouth every day. (Patient not taking: Reported on 1/4/2017), Disp: 30 Tab, Rfl: 0  ALLERGIES: No Known Allergies  SURGHX: History reviewed. No pertinent surgical history.  SOCHX:  reports that he has been smoking Cigars.  He has a 3.00 pack-year smoking history. He uses smokeless tobacco. He reports that he does not drink alcohol or use drugs.  FH: Reviewed with patient, not pertinent to this visit.           Patient presents with:  Back Pain: Mid back pain radiating to mid abdomen, intermittent nausea and vomiting, body aches  X 1week.           Back Pain   This is a new problem. The current episode started in the past 7 days. The problem occurs intermittently. The problem has been waxing and waning since onset. The pain is present in the lumbar spine. The quality of the pain is described as aching and cramping. Radiates to: around to abdomen. The pain is at a severity of 6/10. Exacerbated by: nothing particularly, just hurts randomly. Associated symptoms include abdominal pain. Pertinent negatives include no bladder incontinence, bowel incontinence, dysuria, fever, leg pain, numbness, paresthesias or tingling. He has tried NSAIDs for the symptoms. The treatment provided mild relief.       Review of Systems   Constitutional: Positive for chills. Negative for fever.   Gastrointestinal: Positive for abdominal pain, nausea and vomiting. Negative for bowel incontinence, constipation and diarrhea.   Genitourinary: Positive for flank pain. Negative for bladder incontinence and dysuria.  "  Musculoskeletal: Positive for back pain.   Neurological: Negative for tingling, sensory change, focal weakness, numbness and paresthesias.   All other systems reviewed and are negative.         Objective:     /70   Pulse (!) 57   Temp 36.3 °C (97.3 °F) (Temporal)   Resp 16   Ht 1.753 m (5' 9\")   Wt 84.4 kg (186 lb)   SpO2 96%   BMI 27.47 kg/m²      Physical Exam   Constitutional: He is oriented to person, place, and time. He appears well-developed and well-nourished. No distress.   HENT:   Head: Normocephalic.   Eyes: Pupils are equal, round, and reactive to light.   Neck: Normal range of motion. Neck supple.   Cardiovascular: Normal rate, regular rhythm and normal heart sounds.    Pulmonary/Chest: Effort normal and breath sounds normal.   Abdominal: Soft. Bowel sounds are normal. There is CVA tenderness.   Musculoskeletal:        Lumbar back: He exhibits no bony tenderness and normal pulse.   Flank pain , no midline ttp no pain with ROM.    Neurological: He is alert and oriented to person, place, and time. He has normal reflexes. He exhibits normal muscle tone. Coordination normal.   Skin: Skin is warm and dry.   Psychiatric: He has a normal mood and affect.   Nursing note and vitals reviewed.         UA results interpreted by me: + blood, neg LE, nitrites       CT reviewed by me, rad reading:     Impression       1.  3 mm right proximal ureteral calculus producing minimal hydronephrosis    2.  No other finding   Reading Provider Reading Date   Vahid Hastings M.D. Dec 2, 2018   Signing Provider Signing Date Signing Time   Vahid Hastings M.D. Dec 2, 2018 10:11 AM       Assessment/Plan:     1. Right ureteral stone  ondansetron (ZOFRAN ODT) 4 MG TABLET DISPERSIBLE   2. Non-intractable vomiting with nausea, unspecified vomiting type     3. Hematuria, microscopic  CT-RENAL COLIC EVALUATION(A/P W/O)    URINE CULTURE(NEW)    ondansetron (ZOFRAN ODT) 4 MG TABLET DISPERSIBLE   4. Flank pain  CT-RENAL COLIC " EVALUATION(A/P W/O)    POCT Urinalysis    URINE CULTURE(NEW)    ondansetron (ZOFRAN ODT) 4 MG TABLET DISPERSIBLE     Motrin/Advil/Ibuprophen 600 mg every 6 hours as needed for pain or fever.    Patient instructed to increase significantly his intake of water as this will help the kidney stone move through his system faster.    PT should follow up with PCP in 1-2 days for re-evaluation if symptoms have not improved.  Discussed red flags and reasons to return to UC or ED.  Pt and/or family verbalized understanding of diagnosis and follow up instructions and was offered informational handout on diagnosis.  PT discharged.

## 2018-12-04 LAB
BACTERIA UR CULT: NORMAL
SIGNIFICANT IND 70042: NORMAL
SITE SITE: NORMAL
SOURCE SOURCE: NORMAL

## 2018-12-09 ENCOUNTER — APPOINTMENT (OUTPATIENT)
Dept: RADIOLOGY | Facility: MEDICAL CENTER | Age: 38
End: 2018-12-09
Attending: EMERGENCY MEDICINE
Payer: COMMERCIAL

## 2018-12-09 ENCOUNTER — HOSPITAL ENCOUNTER (EMERGENCY)
Facility: MEDICAL CENTER | Age: 38
End: 2018-12-09
Attending: EMERGENCY MEDICINE
Payer: COMMERCIAL

## 2018-12-09 VITALS
TEMPERATURE: 97.5 F | DIASTOLIC BLOOD PRESSURE: 96 MMHG | BODY MASS INDEX: 28.08 KG/M2 | WEIGHT: 189.6 LBS | HEART RATE: 51 BPM | OXYGEN SATURATION: 97 % | HEIGHT: 69 IN | SYSTOLIC BLOOD PRESSURE: 150 MMHG | RESPIRATION RATE: 16 BRPM

## 2018-12-09 DIAGNOSIS — N20.0 KIDNEY STONE: ICD-10-CM

## 2018-12-09 LAB
ALBUMIN SERPL BCP-MCNC: 4.5 G/DL (ref 3.2–4.9)
ALBUMIN/GLOB SERPL: 1.7 G/DL
ALP SERPL-CCNC: 50 U/L (ref 30–99)
ALT SERPL-CCNC: 23 U/L (ref 2–50)
ANION GAP SERPL CALC-SCNC: 7 MMOL/L (ref 0–11.9)
APPEARANCE UR: CLEAR
AST SERPL-CCNC: 28 U/L (ref 12–45)
BACTERIA #/AREA URNS HPF: NEGATIVE /HPF
BASOPHILS # BLD AUTO: 0.8 % (ref 0–1.8)
BASOPHILS # BLD: 0.05 K/UL (ref 0–0.12)
BILIRUB SERPL-MCNC: 0.6 MG/DL (ref 0.1–1.5)
BILIRUB UR QL STRIP.AUTO: NEGATIVE
BUN SERPL-MCNC: 17 MG/DL (ref 8–22)
CALCIUM SERPL-MCNC: 9.7 MG/DL (ref 8.5–10.5)
CHLORIDE SERPL-SCNC: 106 MMOL/L (ref 96–112)
CO2 SERPL-SCNC: 25 MMOL/L (ref 20–33)
COLOR UR: YELLOW
CREAT SERPL-MCNC: 1.25 MG/DL (ref 0.5–1.4)
EOSINOPHIL # BLD AUTO: 0.12 K/UL (ref 0–0.51)
EOSINOPHIL NFR BLD: 2 % (ref 0–6.9)
EPI CELLS #/AREA URNS HPF: NEGATIVE /HPF
ERYTHROCYTE [DISTWIDTH] IN BLOOD BY AUTOMATED COUNT: 43 FL (ref 35.9–50)
GLOBULIN SER CALC-MCNC: 2.7 G/DL (ref 1.9–3.5)
GLUCOSE SERPL-MCNC: 102 MG/DL (ref 65–99)
GLUCOSE UR STRIP.AUTO-MCNC: NEGATIVE MG/DL
HCT VFR BLD AUTO: 46.7 % (ref 42–52)
HGB BLD-MCNC: 16 G/DL (ref 14–18)
HYALINE CASTS #/AREA URNS LPF: ABNORMAL /LPF
IMM GRANULOCYTES # BLD AUTO: 0.01 K/UL (ref 0–0.11)
IMM GRANULOCYTES NFR BLD AUTO: 0.2 % (ref 0–0.9)
KETONES UR STRIP.AUTO-MCNC: ABNORMAL MG/DL
LEUKOCYTE ESTERASE UR QL STRIP.AUTO: NEGATIVE
LIPASE SERPL-CCNC: 10 U/L (ref 11–82)
LYMPHOCYTES # BLD AUTO: 1.61 K/UL (ref 1–4.8)
LYMPHOCYTES NFR BLD: 27.1 % (ref 22–41)
MCH RBC QN AUTO: 30.9 PG (ref 27–33)
MCHC RBC AUTO-ENTMCNC: 34.3 G/DL (ref 33.7–35.3)
MCV RBC AUTO: 90.3 FL (ref 81.4–97.8)
MICRO URNS: ABNORMAL
MONOCYTES # BLD AUTO: 0.49 K/UL (ref 0–0.85)
MONOCYTES NFR BLD AUTO: 8.2 % (ref 0–13.4)
NEUTROPHILS # BLD AUTO: 3.66 K/UL (ref 1.82–7.42)
NEUTROPHILS NFR BLD: 61.7 % (ref 44–72)
NITRITE UR QL STRIP.AUTO: NEGATIVE
NRBC # BLD AUTO: 0 K/UL
NRBC BLD-RTO: 0 /100 WBC
PH UR STRIP.AUTO: 5.5 [PH]
PLATELET # BLD AUTO: 208 K/UL (ref 164–446)
PMV BLD AUTO: 10.7 FL (ref 9–12.9)
POTASSIUM SERPL-SCNC: 4.7 MMOL/L (ref 3.6–5.5)
PROT SERPL-MCNC: 7.2 G/DL (ref 6–8.2)
PROT UR QL STRIP: NEGATIVE MG/DL
RBC # BLD AUTO: 5.17 M/UL (ref 4.7–6.1)
RBC # URNS HPF: ABNORMAL /HPF
RBC UR QL AUTO: ABNORMAL
SODIUM SERPL-SCNC: 138 MMOL/L (ref 135–145)
SP GR UR STRIP.AUTO: 1.03
UROBILINOGEN UR STRIP.AUTO-MCNC: 0.2 MG/DL
WBC # BLD AUTO: 5.9 K/UL (ref 4.8–10.8)
WBC #/AREA URNS HPF: ABNORMAL /HPF

## 2018-12-09 PROCEDURE — 81001 URINALYSIS AUTO W/SCOPE: CPT

## 2018-12-09 PROCEDURE — 85025 COMPLETE CBC W/AUTO DIFF WBC: CPT

## 2018-12-09 PROCEDURE — 36415 COLL VENOUS BLD VENIPUNCTURE: CPT

## 2018-12-09 PROCEDURE — 74018 RADEX ABDOMEN 1 VIEW: CPT

## 2018-12-09 PROCEDURE — 83690 ASSAY OF LIPASE: CPT

## 2018-12-09 PROCEDURE — 96375 TX/PRO/DX INJ NEW DRUG ADDON: CPT

## 2018-12-09 PROCEDURE — 700111 HCHG RX REV CODE 636 W/ 250 OVERRIDE (IP): Performed by: EMERGENCY MEDICINE

## 2018-12-09 PROCEDURE — 99284 EMERGENCY DEPT VISIT MOD MDM: CPT

## 2018-12-09 PROCEDURE — 96374 THER/PROPH/DIAG INJ IV PUSH: CPT

## 2018-12-09 PROCEDURE — 76775 US EXAM ABDO BACK WALL LIM: CPT

## 2018-12-09 PROCEDURE — 700105 HCHG RX REV CODE 258: Performed by: EMERGENCY MEDICINE

## 2018-12-09 PROCEDURE — 80053 COMPREHEN METABOLIC PANEL: CPT

## 2018-12-09 RX ORDER — TAMSULOSIN HYDROCHLORIDE 0.4 MG/1
0.4 CAPSULE ORAL
Qty: 7 CAP | Refills: 0 | Status: SHIPPED | OUTPATIENT
Start: 2018-12-09 | End: 2018-12-16

## 2018-12-09 RX ORDER — MORPHINE SULFATE 10 MG/ML
4 INJECTION, SOLUTION INTRAMUSCULAR; INTRAVENOUS ONCE
Status: COMPLETED | OUTPATIENT
Start: 2018-12-09 | End: 2018-12-09

## 2018-12-09 RX ORDER — OXYCODONE HYDROCHLORIDE AND ACETAMINOPHEN 5; 325 MG/1; MG/1
1-2 TABLET ORAL EVERY 4 HOURS PRN
Qty: 20 TAB | Refills: 0 | Status: SHIPPED | OUTPATIENT
Start: 2018-12-09 | End: 2018-12-12

## 2018-12-09 RX ORDER — KETOROLAC TROMETHAMINE 30 MG/ML
30 INJECTION, SOLUTION INTRAMUSCULAR; INTRAVENOUS ONCE
Status: COMPLETED | OUTPATIENT
Start: 2018-12-09 | End: 2018-12-09

## 2018-12-09 RX ORDER — ONDANSETRON 2 MG/ML
4 INJECTION INTRAMUSCULAR; INTRAVENOUS ONCE
Status: COMPLETED | OUTPATIENT
Start: 2018-12-09 | End: 2018-12-09

## 2018-12-09 RX ORDER — SODIUM CHLORIDE 9 MG/ML
1000 INJECTION, SOLUTION INTRAVENOUS ONCE
Status: COMPLETED | OUTPATIENT
Start: 2018-12-09 | End: 2018-12-09

## 2018-12-09 RX ADMIN — KETOROLAC TROMETHAMINE 30 MG: 30 INJECTION, SOLUTION INTRAMUSCULAR at 08:23

## 2018-12-09 RX ADMIN — MORPHINE SULFATE 4 MG: 10 INJECTION INTRAVENOUS at 10:59

## 2018-12-09 RX ADMIN — ONDANSETRON 4 MG: 2 INJECTION INTRAMUSCULAR; INTRAVENOUS at 08:25

## 2018-12-09 RX ADMIN — SODIUM CHLORIDE 1000 ML: 9 INJECTION, SOLUTION INTRAVENOUS at 08:25

## 2018-12-09 ASSESSMENT — ENCOUNTER SYMPTOMS
NAUSEA: 1
FLANK PAIN: 1
FEVER: 0

## 2018-12-09 NOTE — ED NOTES
Discharged home with wife, given prescription x 2 with indication. Pt to follow up with pcp as needed. Given urine strainer. Pt understands to return to ed for fever or   cont pain.

## 2018-12-09 NOTE — ED TRIAGE NOTES
Ambulates to triage  Chief Complaint   Patient presents with   • Flank Pain     Pt was seen at  this week and has a kidney stone on the R side, pain started at 0530 this am and has not stopped. Charge called for a room.

## 2018-12-09 NOTE — ED PROVIDER NOTES
ED Provider Note    Scribed for Rubio Fu M.D. by Pancho Osborne. 12/9/2018, 8:08 AM.    Primary care provider: Pcp Pt States None  Means of arrival: Walk in  History obtained from: Patient  History limited by: None    CHIEF COMPLAINT  Chief Complaint   Patient presents with   • Flank Pain       HPI  Virgilio Hernandez is a 38 y.o. male who presents to the Emergency Department for evaluation of right sided flank pain onset 6 days ago, much worse this morning. The patient was recently evlauatated at urgent care and recently underwent a CT scan, which demonstrated a kidney stone. The patient complains of urinary retention and nausea secondary to pain. He has taken Ibuprofen with some relief. He denies any fevers. The patient was not given any follow up instructions or medications for treamtent at urgent care. He was only told to drink water.    Reviewed CT scan: right small proximal kidney stone    REVIEW OF SYSTEMS  Review of Systems   Constitutional: Negative for fever.   Gastrointestinal: Positive for nausea.   Genitourinary: Positive for flank pain.        Urinary retention   All other systems reviewed and are negative.      PAST MEDICAL HISTORY   has a past medical history of GERD without esophagitis (2/24/2016).    SURGICAL HISTORY  patient denies any surgical history    SOCIAL HISTORY  Social History   Substance Use Topics   • Smoking status: Former Smoker     Packs/day: 1.00     Years: 3.00     Types: Cigars   • Smokeless tobacco: Current User   • Alcohol use No      Comment: quit drinking Dec 2012      History   Drug Use No       FAMILY HISTORY  Family History   Problem Relation Age of Onset   • Hypertension Maternal Grandmother    • Hypertension Maternal Grandfather    • Lung Disease Maternal Grandfather    • Diabetes Paternal Grandmother    • Diabetes Paternal Grandfather        CURRENT MEDICATIONS  Home Medications     Reviewed by Kelsie Quiñonez R.N. (Registered Nurse) on 12/09/18 at 0789   "Med List Status: Partial   Medication Last Dose Status   ibuprofen (MOTRIN) 200 MG Tab 12/9/2018 Active   ondansetron (ZOFRAN ODT) 4 MG TABLET DISPERSIBLE 12/9/2018 Active   pantoprazole (PROTONIX) 20 MG tablet Not taking Active                ALLERGIES  No Known Allergies    PHYSICAL EXAM  VITAL SIGNS: /96   Pulse (!) 50   Temp 36.2 °C (97.2 °F) (Temporal)   Resp 16   Ht 1.753 m (5' 9\")   Wt 86 kg (189 lb 9.5 oz)   SpO2 97%   BMI 28.00 kg/m²     Constitutional: Well developed, Well nourished, In mild distress, Non-toxic appearance.   HENT: Normocephalic, Atraumatic, Bilateral external ears normal, dry mucus membranes, No oral exudates, Nose normal.   Eyes:conjunctiva is normal, there are no signs of exudate.   Neck: Supple, no meningeal signs.  Lymphatic: No lymphadenopathy noted.   Cardiovascular: Regular rate and rhythm without murmurs gallops or rubs.   Thorax & Lungs: No respiratory distress. Breathing comfortably. Lungs are clear to auscultation bilaterally, there are no wheezes no rales. Chest wall is nontender.  Abdomen: Soft, nontender, nondistended. Bowel sounds are present.   Skin: Warm, Dry, No erythema,   Back: No tenderness, No CVA tenderness. No flank tenderness  Musculoskeletal: Good range of motion in all major joints. No tenderness to palpation or major deformities noted. Intact distal pulses, no clubbing, no cyanosis, no edema,   Neurologic: Alert & oriented x 3, Moving all extremities. No gross abnormalities.    Psychiatric: Affect normal, Judgment normal, Mood normal.     LABS  Results for orders placed or performed during the hospital encounter of 12/09/18   CBC WITH DIFFERENTIAL   Result Value Ref Range    WBC 5.9 4.8 - 10.8 K/uL    RBC 5.17 4.70 - 6.10 M/uL    Hemoglobin 16.0 14.0 - 18.0 g/dL    Hematocrit 46.7 42.0 - 52.0 %    MCV 90.3 81.4 - 97.8 fL    MCH 30.9 27.0 - 33.0 pg    MCHC 34.3 33.7 - 35.3 g/dL    RDW 43.0 35.9 - 50.0 fL    Platelet Count 208 164 - 446 K/uL    MPV " 10.7 9.0 - 12.9 fL    Neutrophils-Polys 61.70 44.00 - 72.00 %    Lymphocytes 27.10 22.00 - 41.00 %    Monocytes 8.20 0.00 - 13.40 %    Eosinophils 2.00 0.00 - 6.90 %    Basophils 0.80 0.00 - 1.80 %    Immature Granulocytes 0.20 0.00 - 0.90 %    Nucleated RBC 0.00 /100 WBC    Neutrophils (Absolute) 3.66 1.82 - 7.42 K/uL    Lymphs (Absolute) 1.61 1.00 - 4.80 K/uL    Monos (Absolute) 0.49 0.00 - 0.85 K/uL    Eos (Absolute) 0.12 0.00 - 0.51 K/uL    Baso (Absolute) 0.05 0.00 - 0.12 K/uL    Immature Granulocytes (abs) 0.01 0.00 - 0.11 K/uL    NRBC (Absolute) 0.00 K/uL   COMP METABOLIC PANEL   Result Value Ref Range    Sodium 138 135 - 145 mmol/L    Potassium 4.7 3.6 - 5.5 mmol/L    Chloride 106 96 - 112 mmol/L    Co2 25 20 - 33 mmol/L    Anion Gap 7.0 0.0 - 11.9    Glucose 102 (H) 65 - 99 mg/dL    Bun 17 8 - 22 mg/dL    Creatinine 1.25 0.50 - 1.40 mg/dL    Calcium 9.7 8.5 - 10.5 mg/dL    AST(SGOT) 28 12 - 45 U/L    ALT(SGPT) 23 2 - 50 U/L    Alkaline Phosphatase 50 30 - 99 U/L    Total Bilirubin 0.6 0.1 - 1.5 mg/dL    Albumin 4.5 3.2 - 4.9 g/dL    Total Protein 7.2 6.0 - 8.2 g/dL    Globulin 2.7 1.9 - 3.5 g/dL    A-G Ratio 1.7 g/dL   LIPASE   Result Value Ref Range    Lipase 10 (L) 11 - 82 U/L   URINALYSIS CULTURE, IF INDICATED   Result Value Ref Range    Color Yellow     Character Clear     Specific Gravity 1.030 <1.035    Ph 5.5 5.0 - 8.0    Glucose Negative Negative mg/dL    Ketones Trace (A) Negative mg/dL    Protein Negative Negative mg/dL    Bilirubin Negative Negative    Urobilinogen, Urine 0.2 Negative    Nitrite Negative Negative    Leukocyte Esterase Negative Negative    Occult Blood Large (A) Negative    Micro Urine Req Microscopic    ESTIMATED GFR   Result Value Ref Range    GFR If African American >60 >60 mL/min/1.73 m 2    GFR If Non African American >60 >60 mL/min/1.73 m 2   URINE MICROSCOPIC (W/UA)   Result Value Ref Range    WBC 0-2 (A) /hpf    RBC  (A) /hpf    Bacteria Negative None /hpf     Epithelial Cells Negative /hpf    Hyaline Cast 0-2 /lpf     All labs reviewed by me.    RADIOLOGY  [id number] VIEW   Final Result      1.  The 3 mm right proximal ureter stone seen on the prior CT is not visible on plain film imaging.      US-RENAL   Final Result      1.  There is mild right hydronephrosis.        The radiologist's interpretation of all radiological studies have been reviewed by me.    COURSE & MEDICAL DECISION MAKING  Pertinent Labs & Imaging studies reviewed. (See chart for details)    8:08 AM - Patient seen and examined at bedside. Patient will be treated with Toradol 30 mg and Zofran 4 mg. Ordered Estimated GFR, UA, Lipase, CMP, and CBC to evaluate his symptoms. The differential diagnoses include but are not limited to: kidney stone. The patient will receive pain medication and fluids for treatment.    9:09 AM Ordered Urine microscopic for evaluation.    9:47 AM Ordered DX abdomen for evaluation.    10:43 AM I reevaluated the patient and he was resting in bed. He was feeling better after the fluids, but the pain is coming back. I informed him of his lab and imaging results. I informed him that the will be given Flomax and narcotics to help pass his kidney stone. He can take Aleve or Motrin for pain during the day.  I would like him to follow up with Urology for evaluation. Should the patient develop new or worsening symptoms, he is to return for evaluation. The patient understands and agrees to discharge home.    10:48 AM Patient treated with Morphine 4 mg.    HYDRATION: Based on the patient's presentation of Other not urinating the patient was given IV fluids. IV Hydration was used because oral hydration was not adequate alone. Upon recheck following hydration, the patient was found to demonstrate improvement..    Decision Making:  Patient presents for evaluation.  Clinically it does appear that he has a kidney stone.  CT scan was reviewed as above.  X-ray was obtained shows no signs of  stones however ultrasound does show continued hydronephrosis on the right side.  The patient was treated empirically with a liter bolus of normal saline as above.  Was given initially Toradol which helped but then he started having some increasing pain so I did start with morphine.  At this point do feel the patient has a small 3 mm obstructing stone from prior CT scan.  I will start the patient with the above medications.  Recommend for him to follow-up with urology Dr. Casanova for further outpatient treatment care in 1 week return if any symptoms worsen.    I reviewed prescription monitoring program for patient's narcotic use before prescribing a scheduled drug.The patient will not drink alcohol nor drive with prescribed medications. The patient will return for new or worsening symptoms and is stable at the time of discharge.    I reviewed prescription monitoring program for patient's narcotic use before prescribing a scheduled drug.The patient will not drink alcohol nor drive with prescribed medications        In prescribing controlled substances to this patient, I certify that I have obtained and reviewed the medical history this patient I have also made a good michael effort to obtain applicable records from other providers who have treated the patient and records did not demonstrate any increased risk of substance abuse that would prevent me from prescribing controlled substances.     I have conducted a physical exam and documented it. I have reviewed Mr. Hernandez’s prescription history as maintained by the [place] Prescription Monitoring Program.     I have assessed the patient’s risk for abuse, dependency, and addiction using the validated Opioid Risk Tool available at https://www.mdcalc.com/qaxinf-umfr-jprk-ort-narcotic-abuse.     Given the above, I believe the benefits of controlled substance therapy outweigh the risks. The reasons for prescribing controlled substances include in my professional opinion,  controlled substances are a reasonable choice for this patient. Accordingly, I have discussed the risk and benefits, treatment plan, and alternative therapies with the patient. The patient has been consented for the medication and understands the risks.      DISPOSITION:  Patient will be discharged home in stable condition.    FOLLOW UP:  Jose Casanova M.D.  [address]  [place] [address]  [phone]            OUTPATIENT MEDICATIONS:  Discharge Medication List as of 12/9/2018 11:05 AM      START taking these medications    Details   tamsulosin (FLOMAX) 0.4 MG capsule Take 1 Cap by mouth ONE-HALF HOUR AFTER BREAKFAST for 7 days., Disp-7 Cap, R-0, Print Rx Paper      oxyCODONE-acetaminophen (PERCOCET) 5-325 MG Tab Take 1-2 Tabs by mouth every four hours as needed for up to 3 days., Disp-20 Tab, R-0, Print Rx Paper, For 3 days              FINAL IMPRESSION  1. Kidney stone         Pancho BOLDEN (Scribe), am scribing for, and in the presence of, Rubio Fu M.D.     Electronically signed by: Pancho Osborne (Scribe), 12/9//2018     IRubio M.D. personally performed the services described in this documentation, as scribed by Pancho Osborne in my presence, and it is both accurate and complete. C    The note accurately reflects work and decisions made by me.  Rubio Fu  12/9/2018  2:46 PM

## 2018-12-11 ENCOUNTER — TELEPHONE (OUTPATIENT)
Dept: SCHEDULING | Facility: IMAGING CENTER | Age: 38
End: 2018-12-11

## 2018-12-11 ENCOUNTER — PATIENT OUTREACH (OUTPATIENT)
Dept: HEALTH INFORMATION MANAGEMENT | Facility: OTHER | Age: 38
End: 2018-12-11

## 2018-12-11 ENCOUNTER — TELEPHONE (OUTPATIENT)
Dept: MEDICAL GROUP | Facility: LAB | Age: 38
End: 2018-12-11

## 2018-12-11 NOTE — PROGRESS NOTES
12/11/2018 1449 - Discharge Outreach - received VM from patient's wife - returned call and LM.   12/11/2018 1503 - Received call back from patient. States he was not given any f/u info for urologist. Per MD notes, patient was instructed to f/u with Dr. Casanova. His info given to patient. Advised patient to check with insurance company to see if they require a PCP referral. No further questions.

## 2018-12-12 ENCOUNTER — OFFICE VISIT (OUTPATIENT)
Dept: MEDICAL GROUP | Facility: LAB | Age: 38
End: 2018-12-12
Payer: COMMERCIAL

## 2018-12-12 VITALS
SYSTOLIC BLOOD PRESSURE: 112 MMHG | WEIGHT: 191 LBS | HEART RATE: 66 BPM | TEMPERATURE: 98.2 F | RESPIRATION RATE: 12 BRPM | DIASTOLIC BLOOD PRESSURE: 68 MMHG | OXYGEN SATURATION: 99 % | BODY MASS INDEX: 28.29 KG/M2 | HEIGHT: 69 IN

## 2018-12-12 DIAGNOSIS — N20.0 KIDNEY STONES: ICD-10-CM

## 2018-12-12 PROCEDURE — 99213 OFFICE O/P EST LOW 20 MIN: CPT | Performed by: FAMILY MEDICINE

## 2018-12-12 ASSESSMENT — PATIENT HEALTH QUESTIONNAIRE - PHQ9: CLINICAL INTERPRETATION OF PHQ2 SCORE: 0

## 2018-12-12 NOTE — TELEPHONE ENCOUNTER
NEW PATIENT VISIT PRE-VISIT PLANNING    1.  [employer] Patient is checked in Patient Demographics? YES    2.  Immunizations were updated in Epic using WebIZ?: Epic matches WebIZ       •  Web Iz Recommendations: FLU, TDAP and VARICELLA (Chicken Pox)     3.  Is this appointment scheduled as a Hospital Follow-Up? No    4.  Patient is due for the following Health Maintenance Topics:   Health Maintenance Due   Topic Date Due   • IMM DTaP/Tdap/Td Vaccine (6 - Tdap) 02/15/1996   • IMM INFLUENZA (1) 09/01/2018           5.  Reviewed/Updated the following with patient:   •   Preferred Pharmacy? NO       •   Preferred Lab? NO       •   Preferred Communication? NO       •   Allergies? NO       •   Medications? NO       •   Social History? NO       •   Family History (document living status of immediate family members and if + hx of cancer, diabetes, hypertension, hyperlipidemia, heart attack, stroke) NO    6.  Updated Care Team?       •   DME Company (gait device, O2, CPAP, etc.) NO       •   Other Specialists (eye doctor, derm, GYN, cardiology, endo, etc): NO    7.  MDX printed for Provider? NO    8.  Patient was NOT informed to arrive 15 min prior to their   scheduled appointment and bring in their medication bottles.

## 2018-12-26 ENCOUNTER — OFFICE VISIT (OUTPATIENT)
Dept: URGENT CARE | Facility: PHYSICIAN GROUP | Age: 38
End: 2018-12-26
Payer: COMMERCIAL

## 2018-12-26 VITALS
BODY MASS INDEX: 26.66 KG/M2 | HEIGHT: 69 IN | OXYGEN SATURATION: 97 % | TEMPERATURE: 97.9 F | SYSTOLIC BLOOD PRESSURE: 118 MMHG | HEART RATE: 64 BPM | RESPIRATION RATE: 14 BRPM | WEIGHT: 180 LBS | DIASTOLIC BLOOD PRESSURE: 80 MMHG

## 2018-12-26 DIAGNOSIS — N20.0 KIDNEY STONE: ICD-10-CM

## 2018-12-26 PROCEDURE — 99214 OFFICE O/P EST MOD 30 MIN: CPT | Performed by: FAMILY MEDICINE

## 2018-12-26 RX ORDER — TRAMADOL HYDROCHLORIDE 50 MG/1
50 TABLET ORAL EVERY 4 HOURS PRN
Qty: 24 TAB | Refills: 0 | Status: SHIPPED | OUTPATIENT
Start: 2018-12-26 | End: 2018-12-30

## 2018-12-27 NOTE — PROGRESS NOTES
"Subjective:   Virgilio Hernandez is a 38 y.o. male who presents for Pain (kidney stones isnt getting better issalina bond see his drnedxt wensday)        Pain   This is a new problem. The current episode started yesterday. The problem occurs constantly. The problem has been rapidly worsening. Associated symptoms include abdominal pain and nausea. Pertinent negatives include no chills, fever or vomiting.     Review of Systems   Constitutional: Negative for chills and fever.   Gastrointestinal: Positive for abdominal pain and nausea. Negative for vomiting.     No Known Allergies   Objective:   /80   Pulse 64   Temp 36.6 °C (97.9 °F) (Temporal)   Resp 14   Ht 1.753 m (5' 9\")   Wt 81.6 kg (180 lb)   SpO2 97%   BMI 26.58 kg/m²   Physical Exam   Constitutional: He is oriented to person, place, and time. He appears well-developed and well-nourished. No distress.   HENT:   Head: Normocephalic and atraumatic.   Eyes: Pupils are equal, round, and reactive to light. Conjunctivae and EOM are normal.   Cardiovascular: Normal rate and regular rhythm.    No murmur heard.  Pulmonary/Chest: Effort normal and breath sounds normal. No respiratory distress.   Abdominal: Soft. He exhibits no distension. There is no tenderness.   Neurological: He is alert and oriented to person, place, and time. He has normal reflexes. No sensory deficit.   Skin: Skin is warm and dry.   Psychiatric: He has a normal mood and affect.         Assessment/Plan:   1. Kidney stone  - tramadol (ULTRAM) 50 MG Tab; Take 1 Tab by mouth every four hours as needed for up to 4 days.  Dispense: 24 Tab; Refill: 0  - Consent for Opiate Prescription    Differential diagnosis, natural history, supportive care, and indications for immediate follow-up discussed.       " Patient baseline mental status

## 2018-12-27 NOTE — PATIENT INSTRUCTIONS
Kidney Stones  Kidney stones (urolithiasis) are solid, rock-like deposits that form inside of the organs that make urine (kidneys). A kidney stone may form in a kidney and move into the bladder, where it can cause intense pain and block the flow of urine. Kidney stones are created when high levels of certain minerals are found in the urine. They are usually passed through urination, but in some cases, medical treatment may be needed to remove them.  What are the causes?  Kidney stones may be caused by:  · A condition in which certain glands produce too much parathyroid hormone (primary hyperparathyroidism), which causes too much calcium buildup in the blood.  · Buildup of uric acid crystals in the bladder (hyperuricosuria). Uric acid is a chemical that the body produces when you eat certain foods. It usually exits the body in the urine.  · Narrowing (stricture) of one or both of the tubes that drain urine from the kidneys to the bladder (ureters).  · A kidney blockage that is present at birth (congenital obstruction).  · Past surgery on the kidney or the ureters, such as gastric bypass surgery.  What increases the risk?  The following factors make you more likely to develop kidney stones:  · Having had a kidney stone in the past.  · Having a family history of kidney stones.  · Not drinking enough water.  · Eating a diet that is high in protein, salt (sodium), or sugar.  · Being overweight or obese.  What are the signs or symptoms?  Symptoms of a kidney stone may include:  · Nausea.  · Vomiting.  · Blood in the urine (hematuria).  · Pain in the side of the abdomen, right below the ribs (flank pain). Pain usually spreads (radiates) to the groin.  · Needing to urinate frequently or urgently.  How is this diagnosed?  This condition may be diagnosed based on:  · Your medical history.  · A physical exam.  · Blood tests.  · Urine tests.  · CT scan.  · Abdominal X-ray.  · A procedure to examine the inside of the bladder  (cystoscopy).  How is this treated?  Treatment for kidney stones depends on the size, location, and makeup of the stones. Treatment may involve:  · Analyzing your urine before and after you pass the stone through urination.  · Being monitored at the hospital until you pass the stone through urination.  · Increasing your fluid intake and decreasing the amount of calcium and protein in your diet.  · A procedure to break up kidney stones in the bladder using:  ¨ A focused beam of light (laser therapy).  ¨ Shock waves (extracorporeal shock wave lithotripsy).  · Surgery to remove kidney stones. This may be needed if you have severe pain or have stones that block your urinary tract.  Follow these instructions at home:  Eating and drinking  · Drink enough fluid to keep your urine clear or pale yellow. This will help you to pass the kidney stone.  · If directed, change your diet. This may include:  ¨ Limiting how much sodium you eat.  ¨ Eating more fruits and vegetables.  ¨ Limiting how much meat, poultry, fish, and eggs you eat.  · Follow instructions from your health care provider about eating or drinking restrictions.  General instructions  · Collect urine samples as told by your health care provider. You may need to collect a urine sample:  ¨ 24 hours after you pass the stone.  ¨ 8-12 weeks after passing the kidney stone, and every 6-12 months after that.  · Strain your urine every time you urinate, for as long as directed. Use the strainer that your health care provider recommends.  · Do not throw out the kidney stone after passing it. Keep the stone so it can be tested by your health care provider. Testing the makeup of your kidney stone may help prevent you from getting kidney stones in the future.  · Take over-the-counter and prescription medicines only as told by your health care provider.  · Keep all follow-up visits as told by your health care provider. This is important. You may need follow-up X-rays or  ultrasounds to make sure that your stone has passed.  How is this prevented?  To prevent another kidney stone:  · Drink enough fluid to keep your urine clear or pale yellow. This is the best way to prevent kidney stones.  · Eat a healthy diet and follow recommendations from your health care provider about foods to avoid. You may be instructed to eat a low-protein diet. Recommendations vary depending on the type of kidney stone that you have.  · Maintain a healthy weight.  Contact a health care provider if:  · You have pain that gets worse or does not get better with medicine.  Get help right away if:  · You have a fever or chills.  · You develop severe pain.  · You develop new abdominal pain.  · You faint.  · You are unable to urinate.  This information is not intended to replace advice given to you by your health care provider. Make sure you discuss any questions you have with your health care provider.  Document Released: 12/18/2006 Document Revised: 07/07/2017 Document Reviewed: 06/02/2017  [employer] Interactive Patient Education © 2017 [employer]

## 2018-12-31 ASSESSMENT — ENCOUNTER SYMPTOMS
PAIN: 1
ABDOMINAL PAIN: 1
NAUSEA: 1
FEVER: 0
VOMITING: 0
CHILLS: 0

## 2019-01-07 ENCOUNTER — HOSPITAL ENCOUNTER (OUTPATIENT)
Dept: RADIOLOGY | Facility: MEDICAL CENTER | Age: 39
End: 2019-01-07
Attending: UROLOGY
Payer: COMMERCIAL

## 2019-01-07 DIAGNOSIS — N20.1 CALCULUS OF URETER: ICD-10-CM

## 2019-01-07 PROCEDURE — 74176 CT ABD & PELVIS W/O CONTRAST: CPT

## 2019-01-28 DIAGNOSIS — Z01.812 PRE-OPERATIVE LABORATORY EXAMINATION: ICD-10-CM

## 2019-01-28 LAB
APPEARANCE UR: CLEAR
BACTERIA #/AREA URNS HPF: NEGATIVE /HPF
BILIRUB UR QL STRIP.AUTO: NEGATIVE
COLOR UR: YELLOW
EPI CELLS #/AREA URNS HPF: NEGATIVE /HPF
GLUCOSE UR STRIP.AUTO-MCNC: NEGATIVE MG/DL
HYALINE CASTS #/AREA URNS LPF: ABNORMAL /LPF
KETONES UR STRIP.AUTO-MCNC: NEGATIVE MG/DL
LEUKOCYTE ESTERASE UR QL STRIP.AUTO: NEGATIVE
MICRO URNS: ABNORMAL
NITRITE UR QL STRIP.AUTO: NEGATIVE
PH UR STRIP.AUTO: 6.5 [PH]
PROT UR QL STRIP: NEGATIVE MG/DL
RBC # URNS HPF: ABNORMAL /HPF
RBC UR QL AUTO: ABNORMAL
SP GR UR STRIP.AUTO: 1.01
UROBILINOGEN UR STRIP.AUTO-MCNC: 0.2 MG/DL
WBC #/AREA URNS HPF: ABNORMAL /HPF

## 2019-01-28 PROCEDURE — 81001 URINALYSIS AUTO W/SCOPE: CPT

## 2019-01-28 PROCEDURE — 87086 URINE CULTURE/COLONY COUNT: CPT

## 2019-01-28 RX ORDER — ACETAMINOPHEN 325 MG/1
650 TABLET ORAL EVERY 4 HOURS PRN
COMMUNITY

## 2019-01-29 ENCOUNTER — APPOINTMENT (OUTPATIENT)
Dept: RADIOLOGY | Facility: MEDICAL CENTER | Age: 39
End: 2019-01-29
Attending: UROLOGY
Payer: COMMERCIAL

## 2019-01-29 ENCOUNTER — HOSPITAL ENCOUNTER (OUTPATIENT)
Facility: MEDICAL CENTER | Age: 39
End: 2019-01-29
Attending: UROLOGY | Admitting: UROLOGY
Payer: COMMERCIAL

## 2019-01-29 VITALS
HEIGHT: 69 IN | RESPIRATION RATE: 16 BRPM | WEIGHT: 182.76 LBS | BODY MASS INDEX: 27.07 KG/M2 | TEMPERATURE: 97.4 F | OXYGEN SATURATION: 94 % | HEART RATE: 53 BPM

## 2019-01-29 DIAGNOSIS — N20.1 RIGHT URETERAL STONE: ICD-10-CM

## 2019-01-29 PROCEDURE — A9270 NON-COVERED ITEM OR SERVICE: HCPCS | Performed by: UROLOGY

## 2019-01-29 PROCEDURE — 501329 HCHG SET, CYSTO IRRIG Y TUR: Performed by: UROLOGY

## 2019-01-29 PROCEDURE — C1769 GUIDE WIRE: HCPCS | Performed by: UROLOGY

## 2019-01-29 PROCEDURE — 82365 CALCULUS SPECTROSCOPY: CPT

## 2019-01-29 PROCEDURE — 160048 HCHG OR STATISTICAL LEVEL 1-5: Performed by: UROLOGY

## 2019-01-29 PROCEDURE — 160035 HCHG PACU - 1ST 60 MINS PHASE I: Performed by: UROLOGY

## 2019-01-29 PROCEDURE — 88300 SURGICAL PATH GROSS: CPT

## 2019-01-29 PROCEDURE — A9270 NON-COVERED ITEM OR SERVICE: HCPCS | Performed by: ANESTHESIOLOGY

## 2019-01-29 PROCEDURE — 700102 HCHG RX REV CODE 250 W/ 637 OVERRIDE(OP): Performed by: ANESTHESIOLOGY

## 2019-01-29 PROCEDURE — 700111 HCHG RX REV CODE 636 W/ 250 OVERRIDE (IP)

## 2019-01-29 PROCEDURE — 500257: Performed by: UROLOGY

## 2019-01-29 PROCEDURE — 160029 HCHG SURGERY MINUTES - 1ST 30 MINS LEVEL 4: Performed by: UROLOGY

## 2019-01-29 PROCEDURE — 700102 HCHG RX REV CODE 250 W/ 637 OVERRIDE(OP): Performed by: UROLOGY

## 2019-01-29 PROCEDURE — 160009 HCHG ANES TIME/MIN: Performed by: UROLOGY

## 2019-01-29 PROCEDURE — 160025 RECOVERY II MINUTES (STATS): Performed by: UROLOGY

## 2019-01-29 PROCEDURE — C1758 CATHETER, URETERAL: HCPCS | Performed by: UROLOGY

## 2019-01-29 PROCEDURE — 700101 HCHG RX REV CODE 250

## 2019-01-29 PROCEDURE — 500879 HCHG PACK, CYSTO: Performed by: UROLOGY

## 2019-01-29 PROCEDURE — 502240 HCHG MISC OR SUPPLY RC 0272: Performed by: UROLOGY

## 2019-01-29 PROCEDURE — 160002 HCHG RECOVERY MINUTES (STAT): Performed by: UROLOGY

## 2019-01-29 PROCEDURE — C2617 STENT, NON-COR, TEM W/O DEL: HCPCS | Performed by: UROLOGY

## 2019-01-29 PROCEDURE — 160046 HCHG PACU - 1ST 60 MINS PHASE II: Performed by: UROLOGY

## 2019-01-29 PROCEDURE — 160041 HCHG SURGERY MINUTES - EA ADDL 1 MIN LEVEL 4: Performed by: UROLOGY

## 2019-01-29 DEVICE — STENT UROLOGICAL POLARIS 6X28  ULTRA: Type: IMPLANTABLE DEVICE | Site: URETER | Status: FUNCTIONAL

## 2019-01-29 RX ORDER — ONDANSETRON 2 MG/ML
4 INJECTION INTRAMUSCULAR; INTRAVENOUS
Status: DISCONTINUED | OUTPATIENT
Start: 2019-01-29 | End: 2019-01-29 | Stop reason: HOSPADM

## 2019-01-29 RX ORDER — OXYCODONE HCL 5 MG/5 ML
5 SOLUTION, ORAL ORAL
Status: COMPLETED | OUTPATIENT
Start: 2019-01-29 | End: 2019-01-29

## 2019-01-29 RX ORDER — SODIUM CHLORIDE, SODIUM LACTATE, POTASSIUM CHLORIDE, CALCIUM CHLORIDE 600; 310; 30; 20 MG/100ML; MG/100ML; MG/100ML; MG/100ML
INJECTION, SOLUTION INTRAVENOUS ONCE
Status: COMPLETED | OUTPATIENT
Start: 2019-01-29 | End: 2019-01-29

## 2019-01-29 RX ORDER — HYDROMORPHONE HYDROCHLORIDE 1 MG/ML
0.2 INJECTION, SOLUTION INTRAMUSCULAR; INTRAVENOUS; SUBCUTANEOUS
Status: DISCONTINUED | OUTPATIENT
Start: 2019-01-29 | End: 2019-01-29 | Stop reason: HOSPADM

## 2019-01-29 RX ORDER — OXYCODONE HYDROCHLORIDE 5 MG/1
5 TABLET ORAL EVERY 4 HOURS PRN
Qty: 12 TAB | Refills: 0 | Status: SHIPPED | OUTPATIENT
Start: 2019-01-29 | End: 2019-01-31

## 2019-01-29 RX ORDER — OXYCODONE HCL 5 MG/5 ML
10 SOLUTION, ORAL ORAL
Status: COMPLETED | OUTPATIENT
Start: 2019-01-29 | End: 2019-01-29

## 2019-01-29 RX ORDER — DIPHENHYDRAMINE HYDROCHLORIDE 50 MG/ML
12.5 INJECTION INTRAMUSCULAR; INTRAVENOUS
Status: DISCONTINUED | OUTPATIENT
Start: 2019-01-29 | End: 2019-01-29 | Stop reason: HOSPADM

## 2019-01-29 RX ORDER — HYDROMORPHONE HYDROCHLORIDE 1 MG/ML
0.1 INJECTION, SOLUTION INTRAMUSCULAR; INTRAVENOUS; SUBCUTANEOUS
Status: DISCONTINUED | OUTPATIENT
Start: 2019-01-29 | End: 2019-01-29 | Stop reason: HOSPADM

## 2019-01-29 RX ORDER — SODIUM CHLORIDE, SODIUM LACTATE, POTASSIUM CHLORIDE, CALCIUM CHLORIDE 600; 310; 30; 20 MG/100ML; MG/100ML; MG/100ML; MG/100ML
INJECTION, SOLUTION INTRAVENOUS CONTINUOUS
Status: DISCONTINUED | OUTPATIENT
Start: 2019-01-29 | End: 2019-01-29 | Stop reason: HOSPADM

## 2019-01-29 RX ORDER — HYDROMORPHONE HYDROCHLORIDE 1 MG/ML
0.4 INJECTION, SOLUTION INTRAMUSCULAR; INTRAVENOUS; SUBCUTANEOUS
Status: DISCONTINUED | OUTPATIENT
Start: 2019-01-29 | End: 2019-01-29 | Stop reason: HOSPADM

## 2019-01-29 RX ORDER — PHENAZOPYRIDINE HYDROCHLORIDE 100 MG/1
200 TABLET, FILM COATED ORAL
Status: COMPLETED | OUTPATIENT
Start: 2019-01-29 | End: 2019-01-29

## 2019-01-29 RX ORDER — OXYCODONE HYDROCHLORIDE 5 MG/1
5 TABLET ORAL EVERY 4 HOURS PRN
Status: DISCONTINUED | OUTPATIENT
Start: 2019-01-29 | End: 2019-01-29 | Stop reason: HOSPADM

## 2019-01-29 RX ORDER — MEPERIDINE HYDROCHLORIDE 25 MG/ML
12.5 INJECTION INTRAMUSCULAR; INTRAVENOUS; SUBCUTANEOUS
Status: DISCONTINUED | OUTPATIENT
Start: 2019-01-29 | End: 2019-01-29 | Stop reason: HOSPADM

## 2019-01-29 RX ORDER — LIDOCAINE HYDROCHLORIDE 10 MG/ML
INJECTION, SOLUTION INFILTRATION; PERINEURAL
Status: COMPLETED
Start: 2019-01-29 | End: 2019-01-29

## 2019-01-29 RX ORDER — HALOPERIDOL 5 MG/ML
1 INJECTION INTRAMUSCULAR
Status: DISCONTINUED | OUTPATIENT
Start: 2019-01-29 | End: 2019-01-29 | Stop reason: HOSPADM

## 2019-01-29 RX ADMIN — OXYCODONE HYDROCHLORIDE 10 MG: 5 SOLUTION ORAL at 17:52

## 2019-01-29 RX ADMIN — LIDOCAINE HYDROCHLORIDE 0.5 ML: 10 INJECTION, SOLUTION INFILTRATION; PERINEURAL at 14:45

## 2019-01-29 RX ADMIN — SODIUM CHLORIDE, SODIUM LACTATE, POTASSIUM CHLORIDE, CALCIUM CHLORIDE: 600; 310; 30; 20 INJECTION, SOLUTION INTRAVENOUS at 14:45

## 2019-01-29 RX ADMIN — PHENAZOPYRIDINE 200 MG: 100 TABLET ORAL at 17:35

## 2019-01-29 RX ADMIN — Medication 0.5 ML: at 14:45

## 2019-01-29 NOTE — OR NURSING
Patient allergies and NPO status verified. Home medication reconciliation completed and belongings with wife. Patient verbalizes understanding of pain scale, expected course of stay and plan of care. Surgical site verified with patient. IV access established. Call light within reach. No further needs at this time; hourly rounding in place.

## 2019-01-30 LAB
BACTERIA UR CULT: NORMAL
PATHOLOGY CONSULT NOTE: NORMAL
SIGNIFICANT IND 70042: NORMAL
SITE SITE: NORMAL
SOURCE SOURCE: NORMAL

## 2019-01-30 NOTE — DISCHARGE INSTRUCTIONS
ACTIVITY: Rest and take it easy for the first 24 hours.  A responsible adult is recommended to remain with you during that time.  It is normal to feel sleepy.  We encourage you to not do anything that requires balance, judgment or coordination.    MILD FLU-LIKE SYMPTOMS ARE NORMAL. YOU MAY EXPERIENCE GENERALIZED MUSCLE ACHES, THROAT IRRITATION, HEADACHE AND/OR SOME NAUSEA.    FOR 24 HOURS DO NOT:  Drive, operate machinery or run household appliances.  Drink beer or alcoholic beverages.   Make important decisions or sign legal documents.    SPECIAL INSTRUCTIONS:   To office thursday to remove stent    Cystoscopy, Care After  Refer to this sheet in the next few weeks. These instructions provide you with information on caring for yourself after your procedure. Your caregiver may also give you more specific instructions. Your treatment has been planned according to current medical practices, but problems sometimes occur. Call your caregiver if you have any problems or questions after your procedure.  HOME CARE INSTRUCTIONS   Things you can do to ease any discomfort after your procedure include:  · Drinking enough water and fluids to keep your urine clear or pale yellow.  · Taking a warm bath to relieve any burning feelings.  SEEK IMMEDIATE MEDICAL CARE IF:   · You have an increase in blood in your urine.  · You notice blood clots in your urine.  · You have difficulty passing urine.  · You have the chills.  · You have abdominal pain.  · You have a fever or persistent symptoms for more than 2-3 days.  · You have a fever and your symptoms suddenly get worse.  MAKE SURE YOU:   · Understand these instructions.  · Will watch your condition.  · Will get help right away if you are not doing well or get worse.     This information is not intended to replace advice given to you by your health care provider. Make sure you discuss any questions you have with your health care provider.     Document Released: 07/07/2006 Document  Revised: 01/08/2016 Document Reviewed: 06/10/2013  [employer] Interactive Patient Education ©2016 [employer]    Ureteral Stent Implantation, Care After  Refer to this sheet in the next few weeks. These instructions provide you with information on caring for yourself after your procedure. Your health care provider may also give you more specific instructions. Your treatment has been planned according to current medical practices, but problems sometimes occur. Call your health care provider if you have any problems or questions after your procedure.  WHAT TO EXPECT AFTER THE PROCEDURE  You should be back to normal activity within 48 hours after the procedure. Nausea and vomiting may occur and are commonly the result of anesthesia.  It is common to experience sharp pain in the back or lower abdomen and penis with voiding. This is caused by movement of the ends of the stent with the act of urinating. It usually goes away within minutes after you have stopped urinating.  HOME CARE INSTRUCTIONS  Make sure to drink plenty of fluids. You may have small amounts of bleeding, causing your urine to be red. This is normal. Certain movements may trigger pain or a feeling that you need to urinate. You may be given medicines to prevent infection or bladder spasms. Be sure to take all medicines as directed. Only take over-the-counter or prescription medicines for pain, discomfort, or fever as directed by your health care provider. Do not take aspirin, as this can make bleeding worse.  Your stent will be left in until the blockage is resolved. This may take 2 weeks or longer, depending on the reason for stent implantation. You may have an X-ray exam to make sure your ureter is open and that the stent has not moved out of position (migrated). The stent can be removed by your health care provider in the office. Medicines may be given for comfort while the stent is being removed. Be sure to keep all follow-up appointments so your  health care provider can check that you are healing properly.  SEEK MEDICAL CARE IF:  · You experience increasing pain.  · Your pain medicine is not working.  SEEK IMMEDIATE MEDICAL CARE IF:  · Your urine is dark red or has blood clots.  · You are leaking urine (incontinent).  · You have a fever, chills, feeling sick to your stomach (nausea), or vomiting.  · Your pain is not relieved by pain medicine.  · The end of the stent comes out of the urethra.  · You are unable to urinate.     This information is not intended to replace advice given to you by your health care provider. Make sure you discuss any questions you have with your health care provider.     Document Released: 08/20/2014 Document Revised: 12/23/2014 Document Reviewed: 08/20/2014  [employer] Interactive Patient Education ©2016 [employer]        DIET: To avoid nausea, slowly advance diet as tolerated, avoiding spicy or greasy foods for the first day.  Add more substantial food to your diet according to your physician's instructions.  INCREASE FLUIDS AND FIBER TO AVOID CONSTIPATION.    SURGICAL DRESSING/BATHING: N/A    FOLLOW-UP APPOINTMENT:  A follow-up appointment should be arranged with your doctor in 1-2 weeks; call to schedule.    You should CALL YOUR PHYSICIAN if you develop:  Fever greater than 101 degrees F.  Pain not relieved by medication, or persistent nausea or vomiting.  Excessive bleeding (blood soaking through dressing) or unexpected drainage from the wound.  Extreme redness or swelling around the incision site, drainage of pus or foul smelling drainage.  Inability to urinate or empty your bladder within 8 hours.  Problems with breathing or chest pain.    You should call 911 if you develop problems with breathing or chest pain.  If you are unable to contact your doctor or surgical center, you should go to the nearest emergency room or urgent care center.  Physician's telephone #: Dr. Sarabia: [phone]    If any questions arise, call  your doctor.  If your doctor is not available, please feel free to call the Surgical Center at [phone].  The Center is open Monday through Friday from 7AM to 7PM.  You can also call the HEALTH HOTLINE open 24 hours/day, 7 days/week and speak to a nurse at [phone], or toll free at [phone].    A registered nurse may call you a few days after your surgery to see how you are doing after your procedure.    MEDICATIONS: Resume taking daily medication.  Take prescribed pain medication with food.  If no medication is prescribed, you may take non-aspirin pain medication if needed.  PAIN MEDICATION CAN BE VERY CONSTIPATING.  Take a stool softener or laxative such as senokot, pericolace, or milk of magnesia if needed.    Prescription given for roxicodone (pain medication).  Last pain medication given at 5:52pm (oxycodone).    If your physician has prescribed pain medication that includes Acetaminophen (Tylenol), do not take additional Acetaminophen (Tylenol) while taking the prescribed medication.    Depression / Suicide Risk    As you are discharged from this [facility] facility, it is important to learn how to keep safe from harming yourself.    Recognize the warning signs:  · Abrupt changes in personality, positive or negative- including increase in energy   · Giving away possessions  · Change in eating patterns- significant weight changes-  positive or negative  · Change in sleeping patterns- unable to sleep or sleeping all the time   · Unwillingness or inability to communicate  · Depression  · Unusual sadness, discouragement and loneliness  · Talk of wanting to die  · Neglect of personal appearance   · Rebelliousness- reckless behavior  · Withdrawal from people/activities they love  · Confusion- inability to concentrate     If you or a loved one observes any of these behaviors or has concerns about self-harm, here's what you can do:  · Talk about it- your feelings and reasons for harming  yourself  · Remove any means that you might use to hurt yourself (examples: pills, rope, extension cords, firearm)  · Get professional help from the community (Mental Health, Substance Abuse, psychological counseling)  · Do not be alone:Call your Safe Contact- someone whom you trust who will be there for you.  · Call your local CRISIS HOTLINE 322-1392 or 649-126-2781  · Call your local Children's Mobile Crisis Response Team [place] (022) 897-7138 or www.[employer]  · Call the toll free National Suicide Prevention Hotlines   · National Suicide Prevention Lifeline 411-334-FVPY (8342)  · National Hope Line Network 800-SUICIDE (708-4308)

## 2019-01-30 NOTE — OR NURSING
Pt VSS, pain controlled, tolerating po intake. Pt and family given discharge education/instructions, all questions answered. IV discontinued, site wnl. S/P cysto/stent placement, pt voided in pacu. Pt states minimal pressure/tender to bladder/penis. Pt discharged home in stable condition with all belongings and script. Pt requested to ambulate on discharge, pt steady, denies dizziness/lightheadedness on discharge, family with pt.

## 2019-01-30 NOTE — OR NURSING
Pt to PACU s/p cystoscopy, ureteroscopy, lasertripsy. Awake upon arrival to PACU, oriented, [place]. VSS throughout stay, NSR on monitor, Bps WNL, maintaining sats on RA. Tolerating PO liquids with no c/o nausea. Chief c/o dysuria, Oxycodone and Pyridium administered in PACU. Pt able to ambulate to BR and void successfully. Updated pt and spouse to POC, emotional support provided. Stable for transfer to phase two. Report to SUSAN Valiente.

## 2019-01-30 NOTE — OP REPORT
OPERATIVE NOTE:      Date: 1/29/2019    Patient ID:   Name:             Virgilio Hernandez     YOB: 1980  Age:                 38 y.o.  male   MRN:               [id number]                                                                         PRE-OP DIAGNOSIS: right ureteral stone        POST-OP DIAGNOSIS: same            PROCEDURE: Procedure(s) and Anesthesia Type:     * CYSTOSCOPY STENT PLACEMENT - General     * URETEROSCOPY - General     * LASERTRIPSY - LITHO - General            SURGEON: Surgeon(s) and Role:     * Alexis Sarabia M.D. - Primary            ANESTHESIA: get            ESTIMATED BLOOD LOSS: none            DRAINS: stent                  SPECIMENS: stone             IMPLANTS: none            COMPLICATIONS: nond                   CONDITION: stable            TECHNIQUE: Patient is brought to the operating room.  He is given general anesthesia.  Prepped and draped in sterile fashion.  Placed in lithotomy position.  Cystoscopy shows normal urethra and bladder.  Wire was passed up the right orifice.  Initial attempted passing the ureteroscope not successful.  [place] dilator is used to dilate the distal ureter.  I then repassed the ureteroscope without difficulty.  The stone is identified.  It is fragmented with the holmium laser fiber at 500 mJ at 6 at 5 Hz.  Fragments are dropped in the bladder.  Collecting system is opacified.  Over the wire a 28 cm 6 Frisian double pigtail stent is placed.  It is left on string for easy retrieval.  Stone fragments were collected and sent for analysis.  Patient sent to recovery in stable condition

## 2019-02-02 LAB
APPEARANCE STONE: NORMAL
COMPN STONE: NORMAL
NUMBER STONE: 2
SIZE STONE: NORMAL MM
SPECIMEN WT: 5 MG

## 2019-02-13 ENCOUNTER — HOSPITAL ENCOUNTER (OUTPATIENT)
Dept: RADIOLOGY | Facility: MEDICAL CENTER | Age: 39
End: 2019-02-13
Attending: UROLOGY
Payer: COMMERCIAL

## 2019-02-13 DIAGNOSIS — N20.0 CALCULUS OF KIDNEY: ICD-10-CM

## 2019-02-13 PROCEDURE — 74018 RADEX ABDOMEN 1 VIEW: CPT

## 2020-10-29 ENCOUNTER — APPOINTMENT (OUTPATIENT)
Dept: URGENT CARE | Facility: PHYSICIAN GROUP | Age: 40
End: 2020-10-29

## 2021-03-29 NOTE — PROGRESS NOTES
Subjective:     CC: Kidney stones    HPI:   Virgilio Melissa presents today with follow-up ER visit.  Approximately 3 weeks ago he began to have as to what he describes lower back pain she believed it was a muscle strain.  He had several episodes of nausea, vomiting, and body aches.  He presented to urgent care where a CT renal was performed and was significant for a 3 mm kidney stone and a UA positive for blood.  He was given pain medications and antiemetics along with a strainer.  Approximately 5 days later he experienced severe debilitating pain that was then evaluated in the ER.  It was found to be nonobstructing, he was given morphine and discharged with Flomax and Percocets.  Today he states that his pain has improved however it has changed location and is more towards his groin area.  He also states last night he began to urinate and he has increased his water intake significantly.  He denies having any fevers, chills, night sweats and no longer having any nausea or vomiting.  He has been straining his urine however he has not noticed any sediments at this point.  He is here for urology referral.        Past Medical History:   Diagnosis Date   • GERD without esophagitis 2/24/2016       Social History   Substance Use Topics   • Smoking status: Former Smoker     Packs/day: 1.00     Years: 3.00     Types: Cigars   • Smokeless tobacco: Current User   • Alcohol use No      Comment: quit drinking Dec 2012       Current Outpatient Prescriptions Ordered in [facility]   Medication Sig Dispense Refill   • tamsulosin (FLOMAX) 0.4 MG capsule Take 1 Cap by mouth ONE-HALF HOUR AFTER BREAKFAST for 7 days. 7 Cap 0   • oxyCODONE-acetaminophen (PERCOCET) 5-325 MG Tab Take 1-2 Tabs by mouth every four hours as needed for up to 3 days. 20 Tab 0   • ondansetron (ZOFRAN ODT) 4 MG TABLET DISPERSIBLE Take 1 Tab by mouth every 6 hours as needed for Nausea. 10 Tab 1   • ibuprofen (MOTRIN) 200 MG Tab Take 200 mg by mouth every 6 hours as needed.    "  • pantoprazole (PROTONIX) 20 MG tablet Take 1 Tab by mouth every day. (Patient not taking: Reported on 1/4/2017) 30 Tab 0     No current Epic-ordered facility-administered medications on file.        Allergies:  Patient has no known allergies.    Health Maintenance: Postponed    ROS:  Gen: no fevers/chill, no changes in weight  Eyes: no changes in vision  ENT: no sore throat, no hearing loss, no bloody nose  Pulm: no sob, no cough  CV: no chest pain, no palpitations  GI: no nausea/vomiting, no diarrhea  : Positive for dysuria  MSk: Positive for right flank pain  Skin: no rash  Neuro: no headaches, no numbness/tingling  Heme/Lymph: no easy bruising      Objective:       Exam:  /68   Pulse 66   Temp 36.8 °C (98.2 °F)   Resp 12   Ht 1.753 m (5' 9.02\")   Wt 86.6 kg (191 lb)   SpO2 99%   BMI 28.19 kg/m²  Body mass index is 28.19 kg/m².    Gen: Alert and oriented, No apparent distress.  Neck: Neck is supple without lymphadenopathy.  Lungs: Normal effort, CTA bilaterally, no wheezes, rhonchi, or rales  CV: Regular rate and rhythm. No murmurs, rubs, or gallops.               Ext: No clubbing, cyanosis, edema.  CVA tenderness      Assessment & Plan:     38 y.o. male with the following -     1. Kidney stones  This is an acute issue for this patient.  We discussed the ER precautions and signs to look for concerning an obstructing stone and infection leading to emergent stenting.  We discussed hydration and pain control.  The pain is improving and the location is changing, it is likely he will self pass the stone.  He is to continue the Flomax as well which will assist in this.  In the future, we discussed HCTZ as an option for prevention.  We will refer to urology in case this stone does not pass.  ER and follow-up precautions discussed.  Continue to monitor.  - REFERRAL TO UROLOGY    Please note that this dictation was created using voice recognition software. I have made every reasonable attempt to correct " obvious errors, but I expect that there are errors of grammar and possibly content that I did not discover before finalizing the note.       Jaron Byrd  Podiatric Medicine and Surgery  [address] [place] [address]  Phone: [phone]  Fax: [phone]  Follow Up Time:

## 2021-04-13 ENCOUNTER — IMMUNIZATION (OUTPATIENT)
Dept: FAMILY PLANNING/WOMEN'S HEALTH CLINIC | Facility: IMMUNIZATION CENTER | Age: 41
End: 2021-04-13
Payer: COMMERCIAL

## 2021-04-13 DIAGNOSIS — Z23 ENCOUNTER FOR VACCINATION: Primary | ICD-10-CM

## 2021-04-13 PROCEDURE — 0001A PFIZER SARS-COV-2 VACCINE: CPT

## 2021-04-13 PROCEDURE — 91300 PFIZER SARS-COV-2 VACCINE: CPT

## 2021-05-07 ENCOUNTER — IMMUNIZATION (OUTPATIENT)
Dept: FAMILY PLANNING/WOMEN'S HEALTH CLINIC | Facility: IMMUNIZATION CENTER | Age: 41
End: 2021-05-07
Payer: COMMERCIAL

## 2021-05-07 DIAGNOSIS — Z23 ENCOUNTER FOR VACCINATION: Primary | ICD-10-CM

## 2021-05-07 PROCEDURE — 91300 PFIZER SARS-COV-2 VACCINE: CPT | Performed by: INTERNAL MEDICINE

## 2021-05-07 PROCEDURE — 0002A PFIZER SARS-COV-2 VACCINE: CPT | Performed by: INTERNAL MEDICINE

## 2023-02-26 ENCOUNTER — TELEPHONE (OUTPATIENT)
Dept: URGENT CARE | Facility: PHYSICIAN GROUP | Age: 43
End: 2023-02-26
Payer: COMMERCIAL

## 2024-01-19 ENCOUNTER — HOSPITAL ENCOUNTER (OUTPATIENT)
Dept: RADIOLOGY | Facility: MEDICAL CENTER | Age: 44
End: 2024-01-19
Attending: NURSE PRACTITIONER
Payer: COMMERCIAL

## 2024-01-19 ENCOUNTER — OFFICE VISIT (OUTPATIENT)
Dept: URGENT CARE | Facility: PHYSICIAN GROUP | Age: 44
End: 2024-01-19
Payer: COMMERCIAL

## 2024-01-19 VITALS
TEMPERATURE: 97.2 F | RESPIRATION RATE: 16 BRPM | BODY MASS INDEX: 29.49 KG/M2 | DIASTOLIC BLOOD PRESSURE: 78 MMHG | WEIGHT: 199.08 LBS | HEART RATE: 53 BPM | SYSTOLIC BLOOD PRESSURE: 112 MMHG | OXYGEN SATURATION: 99 % | HEIGHT: 69 IN

## 2024-01-19 DIAGNOSIS — Z87.442 HISTORY OF KIDNEY STONES: ICD-10-CM

## 2024-01-19 DIAGNOSIS — R10.9 FLANK PAIN: ICD-10-CM

## 2024-01-19 DIAGNOSIS — M54.50 ACUTE BILATERAL LOW BACK PAIN WITHOUT SCIATICA: ICD-10-CM

## 2024-01-19 LAB
APPEARANCE UR: CLEAR
BILIRUB UR STRIP-MCNC: NEGATIVE MG/DL
COLOR UR AUTO: NORMAL
GLUCOSE UR STRIP.AUTO-MCNC: NEGATIVE MG/DL
KETONES UR STRIP.AUTO-MCNC: NEGATIVE MG/DL
LEUKOCYTE ESTERASE UR QL STRIP.AUTO: NEGATIVE
NITRITE UR QL STRIP.AUTO: NEGATIVE
PH UR STRIP.AUTO: 5.5 [PH] (ref 5–8)
PROT UR QL STRIP: NEGATIVE MG/DL
RBC UR QL AUTO: NEGATIVE
SP GR UR STRIP.AUTO: >=1.03
UROBILINOGEN UR STRIP-MCNC: 0.2 MG/DL

## 2024-01-19 PROCEDURE — 3074F SYST BP LT 130 MM HG: CPT | Performed by: NURSE PRACTITIONER

## 2024-01-19 PROCEDURE — 3078F DIAST BP <80 MM HG: CPT | Performed by: NURSE PRACTITIONER

## 2024-01-19 PROCEDURE — 99203 OFFICE O/P NEW LOW 30 MIN: CPT | Performed by: NURSE PRACTITIONER

## 2024-01-19 PROCEDURE — 81002 URINALYSIS NONAUTO W/O SCOPE: CPT | Performed by: NURSE PRACTITIONER

## 2024-01-19 PROCEDURE — 74176 CT ABD & PELVIS W/O CONTRAST: CPT

## 2024-01-19 RX ORDER — KETOROLAC TROMETHAMINE 30 MG/ML
30 INJECTION, SOLUTION INTRAMUSCULAR; INTRAVENOUS ONCE
Status: COMPLETED | OUTPATIENT
Start: 2024-01-19 | End: 2024-01-19

## 2024-01-19 RX ORDER — IBUPROFEN 400 MG/1
400 TABLET ORAL EVERY 6 HOURS PRN
COMMUNITY

## 2024-01-19 RX ADMIN — KETOROLAC TROMETHAMINE 30 MG: 30 INJECTION, SOLUTION INTRAMUSCULAR; INTRAVENOUS at 08:55

## 2024-01-19 NOTE — PROGRESS NOTES
Virgilio Hernandez is a 43 y.o. male who presents for Nephrolithiasis (Possible kidney stone, lower back pain that started on both sides but now mostly on left side and radiates down to the bladder, X 6 days has had a HX of kidney stones)      HPI  This is a new problem. Virgilio Hernandez is a 43 y.o. patient who presents to urgent care with c/o: Patient believes he has a hit kidney stone.  He has bilateral lower back pain.  Is been present for about 6 days but getting worse.  The pain radiates on the left side down toward his bladder.  He has not seen any blood in his urine.  Last night was hard to get to a position of comfort.  He denies any unusual activity but he does do a lot of physical type work.  He denies injury, dysuria, discharge, testicular pain, numbness or tingling lower extremities, difficulty walking, or change in bowel or bladder habits.  Reports his urine flow is normal.  In the past when he had a kidney stone he did see a urologist for follow-up.  No other aggravating leaving factors.    ROS See HPI    Allergies:     No Known Allergies    PMSFS Hx:  Past Medical History:   Diagnosis Date    GERD without esophagitis 2/24/2016    Kidney stone 01/2019     Past Surgical History:   Procedure Laterality Date    CYSTOSCOPY STENT PLACEMENT Right 1/29/2019    Procedure: CYSTOSCOPY STENT PLACEMENT;  Surgeon: Alexis Sarabia M.D.;  Location: SURGERY [facility];  Service: Urology    URETEROSCOPY Right 1/29/2019    Procedure: URETEROSCOPY;  Surgeon: Alexis Sarabia M.D.;  Location: SURGERY [facility];  Service: Urology    LASERTRIPSY Right 1/29/2019    Procedure: LASERTRIPSY - LITHO;  Surgeon: Alexis Sarabia M.D.;  Location: SURGERY [facility];  Service: Urology     Family History   Problem Relation Age of Onset    Hypertension Maternal Grandmother     Hypertension Maternal Grandfather     Lung Disease Maternal Grandfather     Diabetes Paternal Grandmother     Diabetes Paternal Grandfather   "    Social History     Tobacco Use    Smoking status: Former     Current packs/day: 1.00     Average packs/day: 1 pack/day for 3.0 years (3.0 ttl pk-yrs)     Types: Cigars, Cigarettes    Smokeless tobacco: Current     Types: Chew   Substance Use Topics    Alcohol use: No     Alcohol/week: 0.0 oz     Comment: quit drinking Dec 2012       Problems:   Patient Active Problem List   Diagnosis    Vitamin D deficiency    Fatigue    GERD without esophagitis    Tobacco dependence       Medications:   Current Outpatient Medications on File Prior to Visit   Medication Sig Dispense Refill    ibuprofen (MOTRIN) 400 MG Tab Take 400 mg by mouth every 6 hours as needed for Mild Pain or Moderate Pain.      acetaminophen (TYLENOL) 325 MG Tab Take 650 mg by mouth every four hours as needed. (Patient not taking: Reported on 1/19/2024)       No current facility-administered medications on file prior to visit.        Objective:     /78 (BP Location: Left arm, Patient Position: Sitting, BP Cuff Size: Adult)   Pulse (!) 53   Temp 36.2 °C (97.2 °F) (Temporal)   Resp 16   Ht 1.753 m (5' 9\")   Wt 90.3 kg (199 lb 1.2 oz)   SpO2 99%   BMI 29.40 kg/m²     Physical Exam  Vitals and nursing note reviewed.   Constitutional:       Appearance: He is well-developed.   HENT:      Head: Normocephalic.   Cardiovascular:      Rate and Rhythm: Normal rate.      Pulses: Normal pulses.      Heart sounds: Normal heart sounds.   Pulmonary:      Effort: Pulmonary effort is normal.      Breath sounds: Normal breath sounds.   Musculoskeletal:      Cervical back: Normal range of motion.      Lumbar back: Tenderness present. No swelling, edema, deformity, lacerations, spasms or bony tenderness. Normal range of motion.        Back:    Skin:     General: Skin is warm and dry.      Capillary Refill: Capillary refill takes less than 2 seconds.   Neurological:      Mental Status: He is alert and oriented to person, place, and time.      Sensory: Sensation " is intact. No sensory deficit.      Motor: Motor function is intact.      Coordination: Coordination is intact.      Gait: Gait is intact.   Psychiatric:         Mood and Affect: Mood normal.         Speech: Speech normal.         Behavior: Behavior normal.         Thought Content: Thought content normal.         Judgment: Judgment normal.         RADIOLOGY RESULTS   CT-RENAL COLIC EVALUATION(A/P W/O)    Result Date: 1/19/2024 1/19/2024 9:47 AM HISTORY/REASON FOR EXAM:  Left flank pain x2 days. History of stones with left-sided stent. TECHNIQUE/EXAM DESCRIPTION AND NUMBER OF VIEWS: CT scan renal/colic without contrast. 5 mm helical images of the abdomen and pelvis were obtained from the diaphragmatic domes through the pubic symphysis. Low dose optimization technique was utilized for this CT exam including automated exposure control and adjustment of the mA and/or kV according to patient size. COMPARISON: 1/7/2019. FINDINGS: Renal stone: No urinary tract calculus identified. No hydronephrosis. Lung Bases: No pulmonary nodules at the lung bases. No pleural or pericardial fluid. Abdomen: Within the limits of noncontrast technique: Liver: Normal. Spleen: Unremarkable. Pancreas: Unremarkable. Gallbladder: No calcified stones. Biliary: No biliary dilatation.. Adrenal glands: Nonenlarged. Bowel: No bowel wall thickening or bowel dilatation. Lymph nodes: No adenopathy. Vasculature: The abdominal aorta is normal in caliber Peritoneum: Unremarkable without ascites. Musculoskeletal: No aggressive osseous lesion. Pelvis: No obvious abnormality seen in the pelvic structures but evaluation limited on CT. No lymph node enlargement. No free fluid, or free air in the abdomen or pelvis. No aggressive bone lesions are seen.     1. No urinary tract calculus identified. No renal collecting system dilatation.   2. No evidence of inflammatory change in the abdomen or pelvis. The study is limited due to nonuse of intravenous contrast.          Xray: Reviewed and interpreted independently by me. I agree with the radiologist's findings.     Toradol given in clinic today.        Assessment /Associated Orders:      1. Flank pain  POCT Urinalysis    ketorolac (Toradol) injection 30 mg    CT-RENAL COLIC EVALUATION(A/P W/O)      2. History of kidney stones  ketorolac (Toradol) injection 30 mg    CT-RENAL COLIC EVALUATION(A/P W/O)      3. Acute bilateral low back pain without sciatica  ketorolac (Toradol) injection 30 mg    CT-RENAL COLIC EVALUATION(A/P W/O)          Medical Decision Making:    Virgilio is a very pleasant 43 y.o. male who is clinically stable at today's acute urgent care visit.  No acute distress noted.  VSS. Appropriate for outpatient care at this time.   Acute problem today with uncertain prognosis.   No evidence of renal lithiasis today.  Negative hematuria.  Negative renal colic CT.  Most likely musculoskeletal strain.  Tolerated Toradol well without adverse effects. Advised not to take NSAIDS for 6-8 hours post injection.    Discussed Dx, management options (risks,benefits, and alternatives to planned treatment), natural progression and supportive care.  Expressed understanding and the treatment plan was agreed upon.   Questions were encouraged and answered   Return to urgent care prn if new or worsening sx or if there is no improvement in condition prn.    Educated in Red flags and indications to immediately call 911 or present to the Emergency Department.         Please note that this dictation was created using voice recognition software. I have worked with consultants from the vendor as well as technical experts from [employer] [employer] to optimize the interface. I have made every reasonable attempt to correct obvious errors, but I expect that there are errors of grammar and possibly content that I did not discover before finalizing the note.  This note was electronically signed by provider

## 2024-03-07 SDOH — ECONOMIC STABILITY: FOOD INSECURITY: WITHIN THE PAST 12 MONTHS, THE FOOD YOU BOUGHT JUST DIDN'T LAST AND YOU DIDN'T HAVE MONEY TO GET MORE.: NEVER TRUE

## 2024-03-07 SDOH — ECONOMIC STABILITY: HOUSING INSECURITY
IN THE LAST 12 MONTHS, WAS THERE A TIME WHEN YOU DID NOT HAVE A STEADY PLACE TO SLEEP OR SLEPT IN A SHELTER (INCLUDING NOW)?: NO

## 2024-03-07 SDOH — HEALTH STABILITY: MENTAL HEALTH
STRESS IS WHEN SOMEONE FEELS TENSE, NERVOUS, ANXIOUS, OR CAN'T SLEEP AT NIGHT BECAUSE THEIR MIND IS TROUBLED. HOW STRESSED ARE YOU?: ONLY A LITTLE

## 2024-03-07 SDOH — ECONOMIC STABILITY: TRANSPORTATION INSECURITY
IN THE PAST 12 MONTHS, HAS LACK OF RELIABLE TRANSPORTATION KEPT YOU FROM MEDICAL APPOINTMENTS, MEETINGS, WORK OR FROM GETTING THINGS NEEDED FOR DAILY LIVING?: NO

## 2024-03-07 SDOH — ECONOMIC STABILITY: INCOME INSECURITY: IN THE LAST 12 MONTHS, WAS THERE A TIME WHEN YOU WERE NOT ABLE TO PAY THE MORTGAGE OR RENT ON TIME?: NO

## 2024-03-07 SDOH — ECONOMIC STABILITY: FOOD INSECURITY: WITHIN THE PAST 12 MONTHS, YOU WORRIED THAT YOUR FOOD WOULD RUN OUT BEFORE YOU GOT MONEY TO BUY MORE.: NEVER TRUE

## 2024-03-07 SDOH — ECONOMIC STABILITY: TRANSPORTATION INSECURITY
IN THE PAST 12 MONTHS, HAS LACK OF TRANSPORTATION KEPT YOU FROM MEETINGS, WORK, OR FROM GETTING THINGS NEEDED FOR DAILY LIVING?: NO

## 2024-03-07 SDOH — ECONOMIC STABILITY: INCOME INSECURITY: HOW HARD IS IT FOR YOU TO PAY FOR THE VERY BASICS LIKE FOOD, HOUSING, MEDICAL CARE, AND HEATING?: NOT VERY HARD

## 2024-03-07 SDOH — ECONOMIC STABILITY: TRANSPORTATION INSECURITY
IN THE PAST 12 MONTHS, HAS THE LACK OF TRANSPORTATION KEPT YOU FROM MEDICAL APPOINTMENTS OR FROM GETTING MEDICATIONS?: NO

## 2024-03-07 SDOH — HEALTH STABILITY: PHYSICAL HEALTH: ON AVERAGE, HOW MANY MINUTES DO YOU ENGAGE IN EXERCISE AT THIS LEVEL?: 150+ MIN

## 2024-03-07 SDOH — ECONOMIC STABILITY: HOUSING INSECURITY: IN THE LAST 12 MONTHS, HOW MANY PLACES HAVE YOU LIVED?: 1

## 2024-03-07 SDOH — HEALTH STABILITY: PHYSICAL HEALTH: ON AVERAGE, HOW MANY DAYS PER WEEK DO YOU ENGAGE IN MODERATE TO STRENUOUS EXERCISE (LIKE A BRISK WALK)?: 7 DAYS

## 2024-03-07 ASSESSMENT — SOCIAL DETERMINANTS OF HEALTH (SDOH)
HOW OFTEN DO YOU GET TOGETHER WITH FRIENDS OR RELATIVES?: THREE TIMES A WEEK
HOW OFTEN DO YOU ATTEND CHURCH OR RELIGIOUS SERVICES?: NEVER
IN A TYPICAL WEEK, HOW MANY TIMES DO YOU TALK ON THE PHONE WITH FAMILY, FRIENDS, OR NEIGHBORS?: TWICE A WEEK
IN A TYPICAL WEEK, HOW MANY TIMES DO YOU TALK ON THE PHONE WITH FAMILY, FRIENDS, OR NEIGHBORS?: TWICE A WEEK
HOW OFTEN DO YOU ATTENT MEETINGS OF THE CLUB OR ORGANIZATION YOU BELONG TO?: NEVER
DO YOU BELONG TO ANY CLUBS OR ORGANIZATIONS SUCH AS CHURCH GROUPS UNIONS, FRATERNAL OR ATHLETIC GROUPS, OR SCHOOL GROUPS?: NO
HOW MANY DRINKS CONTAINING ALCOHOL DO YOU HAVE ON A TYPICAL DAY WHEN YOU ARE DRINKING: 5 OR 6
DO YOU BELONG TO ANY CLUBS OR ORGANIZATIONS SUCH AS CHURCH GROUPS UNIONS, FRATERNAL OR ATHLETIC GROUPS, OR SCHOOL GROUPS?: NO
HOW HARD IS IT FOR YOU TO PAY FOR THE VERY BASICS LIKE FOOD, HOUSING, MEDICAL CARE, AND HEATING?: NOT VERY HARD
HOW OFTEN DO YOU GET TOGETHER WITH FRIENDS OR RELATIVES?: THREE TIMES A WEEK
WITHIN THE PAST 12 MONTHS, YOU WORRIED THAT YOUR FOOD WOULD RUN OUT BEFORE YOU GOT THE MONEY TO BUY MORE: NEVER TRUE
HOW OFTEN DO YOU ATTENT MEETINGS OF THE CLUB OR ORGANIZATION YOU BELONG TO?: NEVER
HOW OFTEN DO YOU ATTEND CHURCH OR RELIGIOUS SERVICES?: NEVER
HOW OFTEN DO YOU HAVE SIX OR MORE DRINKS ON ONE OCCASION: WEEKLY
HOW OFTEN DO YOU HAVE A DRINK CONTAINING ALCOHOL: 4 OR MORE TIMES A WEEK

## 2024-03-07 ASSESSMENT — LIFESTYLE VARIABLES
HOW OFTEN DO YOU HAVE A DRINK CONTAINING ALCOHOL: 4 OR MORE TIMES A WEEK
AUDIT-C TOTAL SCORE: 9
HOW MANY STANDARD DRINKS CONTAINING ALCOHOL DO YOU HAVE ON A TYPICAL DAY: 5 OR 6
SKIP TO QUESTIONS 9-10: 0
HOW OFTEN DO YOU HAVE SIX OR MORE DRINKS ON ONE OCCASION: WEEKLY

## 2024-03-08 ENCOUNTER — OFFICE VISIT (OUTPATIENT)
Dept: MEDICAL GROUP | Facility: PHYSICIAN GROUP | Age: 44
End: 2024-03-08
Payer: COMMERCIAL

## 2024-03-08 VITALS
DIASTOLIC BLOOD PRESSURE: 70 MMHG | SYSTOLIC BLOOD PRESSURE: 110 MMHG | WEIGHT: 196.5 LBS | RESPIRATION RATE: 20 BRPM | HEART RATE: 64 BPM | TEMPERATURE: 98 F | HEIGHT: 69 IN | BODY MASS INDEX: 29.1 KG/M2 | OXYGEN SATURATION: 97 %

## 2024-03-08 DIAGNOSIS — Z13.228 SCREENING FOR ENDOCRINE, METABOLIC AND IMMUNITY DISORDER: ICD-10-CM

## 2024-03-08 DIAGNOSIS — R53.83 OTHER FATIGUE: ICD-10-CM

## 2024-03-08 DIAGNOSIS — K21.9 GERD WITHOUT ESOPHAGITIS: ICD-10-CM

## 2024-03-08 DIAGNOSIS — E55.9 VITAMIN D DEFICIENCY: ICD-10-CM

## 2024-03-08 DIAGNOSIS — Z13.6 SCREENING FOR CARDIOVASCULAR CONDITION: ICD-10-CM

## 2024-03-08 DIAGNOSIS — Z13.29 SCREENING FOR ENDOCRINE, METABOLIC AND IMMUNITY DISORDER: ICD-10-CM

## 2024-03-08 DIAGNOSIS — Z13.0 SCREENING FOR ENDOCRINE, METABOLIC AND IMMUNITY DISORDER: ICD-10-CM

## 2024-03-08 DIAGNOSIS — J01.41 ACUTE RECURRENT PANSINUSITIS: ICD-10-CM

## 2024-03-08 DIAGNOSIS — Z23 NEED FOR VACCINATION: ICD-10-CM

## 2024-03-08 DIAGNOSIS — J34.89 SINUS PRESSURE: ICD-10-CM

## 2024-03-08 DIAGNOSIS — Z11.59 NEED FOR HEPATITIS C SCREENING TEST: ICD-10-CM

## 2024-03-08 DIAGNOSIS — Z11.3 SCREENING EXAMINATION FOR SEXUALLY TRANSMITTED DISEASE: ICD-10-CM

## 2024-03-08 PROCEDURE — 90715 TDAP VACCINE 7 YRS/> IM: CPT

## 2024-03-08 PROCEDURE — 3078F DIAST BP <80 MM HG: CPT

## 2024-03-08 PROCEDURE — 99214 OFFICE O/P EST MOD 30 MIN: CPT | Mod: 25

## 2024-03-08 PROCEDURE — 90471 IMMUNIZATION ADMIN: CPT

## 2024-03-08 PROCEDURE — 90632 HEPA VACCINE ADULT IM: CPT

## 2024-03-08 PROCEDURE — 3074F SYST BP LT 130 MM HG: CPT

## 2024-03-08 PROCEDURE — 90472 IMMUNIZATION ADMIN EACH ADD: CPT

## 2024-03-08 RX ORDER — AMOXICILLIN AND CLAVULANATE POTASSIUM 875; 125 MG/1; MG/1
1 TABLET, FILM COATED ORAL 2 TIMES DAILY
Qty: 14 TABLET | Refills: 0 | Status: SHIPPED | OUTPATIENT
Start: 2024-03-08 | End: 2024-03-15

## 2024-03-08 RX ORDER — AMOXICILLIN AND CLAVULANATE POTASSIUM 875; 125 MG/1; MG/1
1 TABLET, FILM COATED ORAL 2 TIMES DAILY
Qty: 14 TABLET | Refills: 0 | Status: SHIPPED | OUTPATIENT
Start: 2024-03-08 | End: 2024-03-08

## 2024-03-08 ASSESSMENT — ENCOUNTER SYMPTOMS
DIZZINESS: 0
COUGH: 0
SINUS PAIN: 1
SHORTNESS OF BREATH: 0
CONSTIPATION: 0
ABDOMINAL PAIN: 0
HEADACHES: 0
BLOOD IN STOOL: 0
DIARRHEA: 0
TINGLING: 0
FEVER: 0
NAUSEA: 0
WHEEZING: 0
VOMITING: 0
SORE THROAT: 0
PALPITATIONS: 0
WEIGHT LOSS: 0
CHILLS: 0

## 2024-03-08 ASSESSMENT — PATIENT HEALTH QUESTIONNAIRE - PHQ9: CLINICAL INTERPRETATION OF PHQ2 SCORE: 0

## 2024-03-08 NOTE — PROGRESS NOTES
"Subjective:     Chief Complaint   Patient presents with    Establish Care    Sinusitis     VANESSA JACOME is a 43 y.o. male, who is here today to establish care and discuss sinus issues. His prior PCP was Chanell Pena MD.    Problem   Sinus Pressure    Patient reports he has been having sinus pressure for the last 3 weeks which began after a recent URI.  Associated symptoms include jaw and tooth pain, headaches/head pressure, a dry slight cough especially when laying down.  Denies any fever, chills, nausea, vomiting, diarrhea.  Has been taking ibuprofen and Tylenol no over-the-counter antihistamines or nasal sprays.  Does not have a history of allergies.  No recent sick contacts.     Fatigue    Patient has noted with his recent symptoms an increasing of fatigue.  Patient states that overall he does not have that much fatigue except for in the winter months since he works outside in the weather at the [employer].     Gerd Without Esophagitis    History of esophageal reflux disease.  Does not currently take any medication for this.  Patient states that this is \"hit or miss\" can go years without having symptoms and then when he eats a type of food that he knows triggers his acid reflux he will take Tums as needed.     Tobacco Dependence    Quit smoking in 2014; has been chewing since about one can per day.     Vitamin D Deficiency    In 2014, patient did have vitamin D deficiency.  Has not had any updated lab work since this.  Does not currently take any vitamin D supplementation.  Due for updated labs.     Allergies: Patient has no known allergies.    Current Outpatient Medications:     amoxicillin-clavulanate (AUGMENTIN) 875-125 MG Tab, Take 1 Tablet by mouth 2 times a day for 7 days., Disp: 14 Tablet, Rfl: 0    ibuprofen (MOTRIN) 400 MG Tab, Take 400 mg by mouth every 6 hours as needed for Mild Pain or Moderate Pain., Disp: , Rfl:     acetaminophen (TYLENOL) 325 MG Tab, Take 650 mg by mouth every four hours as " "needed., Disp: , Rfl:      Review of Systems   Constitutional:  Negative for chills, fever and weight loss.   HENT:  Positive for congestion and sinus pain. Negative for ear discharge, ear pain, hearing loss and sore throat.    Respiratory:  Negative for cough, shortness of breath and wheezing.    Cardiovascular:  Negative for chest pain, palpitations and leg swelling.   Gastrointestinal:  Negative for abdominal pain, blood in stool, constipation, diarrhea, nausea and vomiting.   Genitourinary:  Negative for hematuria.   Skin:  Negative for itching and rash.   Neurological:  Negative for dizziness, tingling and headaches.     Health Maintenance: Completed    Objective:     /70   Pulse 64   Temp 36.7 °C (98 °F) (Temporal)   Resp 20   Ht 1.753 m (5' 9\")   Wt 89.1 kg (196 lb 8 oz)   SpO2 97%  Body mass index is 29.02 kg/m².    Physical Exam  Vitals reviewed.   Constitutional:       Appearance: Normal appearance.   HENT:      Right Ear: A middle ear effusion is present. There is no impacted cerumen. No foreign body. No mastoid tenderness. Tympanic membrane is not injected, scarred, perforated, erythematous or bulging.      Left Ear: A middle ear effusion is present. There is no impacted cerumen. No foreign body. No mastoid tenderness. Tympanic membrane is scarred. Tympanic membrane is not injected, perforated, erythematous or bulging.      Nose: Congestion and rhinorrhea present. Rhinorrhea is clear.      Right Turbinates: Not enlarged or swollen.      Left Turbinates: Not enlarged or swollen.      Right Sinus: Maxillary sinus tenderness and frontal sinus tenderness present.      Left Sinus: Maxillary sinus tenderness and frontal sinus tenderness present.      Mouth/Throat:      Lips: Pink.      Mouth: Mucous membranes are moist.      Pharynx: Oropharynx is clear.   Musculoskeletal:      Cervical back: Neck supple.   Lymphadenopathy:      Cervical: No cervical adenopathy.   Neurological:      Mental Status: " He is alert.       Assessment & Plan:     The following plan was discussed through shared decision making with the patient:    1. Acute recurrent pansinusitis  2. Sinus pressure  Acute, uncomplicated.  Acute, uncomplicated.    HPI and exam somewhat consistent with bacterial sinusitis, > 10 days with progressive worsening and maxillary/frontal sinus tenderness on exam.   Patient to start over-the-counter symptom treatment using Flonase nasal spray and over-the-counter will antihistamine such as Zyrtec, Claritin, or Allegra.  If symptoms do not improve over the next 2 days, patient may start  and complete entire course of antibiotics.   May also use saline nasal spray, 1-2 sprays every 2-3 hours to thin mucus and reduce inflammation.  Fluticasone or mometasone nasal spray has been shown to reduce symptom length and severity when used twice daily for the duration of a sinus infection; in addition to preventing a recurrence of sinusitis when used consistently for at least 21 days.   Recommended supplementing with vitamin C 500mg daily and zinc while not feeling well.  - amoxicillin-clavulanate (AUGMENTIN) 875-125 MG Tab; Take 1 Tablet by mouth 2 times a day for 7 days.  Dispense: 14 Tablet; Refill: 0    3. GERD without esophagitis  Chronic, controlled.  Patient to continue current regimen as symptoms arise.  Encourage patient to follow-up as needed if the symptoms persist or become more frequent.    4. Other fatigue  5. Vitamin D deficiency  Chronic, uncontrolled.  Last lab work in 2014 showed vitamin D deficiency no record of further repeat and supplementation.  We will get updated lab work to evaluate vitamin D levels.  - VITAMIN D,25 HYDROXY (DEFICIENCY); Future    6. Screening for endocrine, metabolic and immunity disorder  Patient establishing care today in office.  Due for updated lab work/screenings and vaccinations.  Will follow-up in [facility] with lab results.  - Comp Metabolic Panel; Future  - CBC WITH  DIFFERENTIAL; Future  - HEMOGLOBIN A1C; Future  - TSH WITH REFLEX TO FT4; Future    7. Screening for cardiovascular condition  See #6  - Lipid Profile; Future    8. Need for hepatitis C screening test  See #6  - HEP C VIRUS ANTIBODY; Future    9. Screening examination for sexually transmitted disease  See #6  - HIV AG/AB COMBO ASSAY SCREENING; Future    10. Need for vaccination  See #6  - Tdap Vaccine =>6YO IM  - Hep A Adult 19+      Return in about 6 months (around 9/8/2024) for Annual.         Please note that this dictation was created using voice recognition software. I have made every reasonable attempt to correct obvious errors, but I expect that there are errors of grammar and possibly content that I did not discover before finalizing the note.    JENNIFER Ferro  [facility]  [facility]

## 2024-04-01 ENCOUNTER — HOSPITAL ENCOUNTER (OUTPATIENT)
Dept: LAB | Facility: MEDICAL CENTER | Age: 44
End: 2024-04-01
Payer: COMMERCIAL

## 2024-04-01 DIAGNOSIS — E55.9 VITAMIN D DEFICIENCY: ICD-10-CM

## 2024-04-01 DIAGNOSIS — Z13.6 SCREENING FOR CARDIOVASCULAR CONDITION: ICD-10-CM

## 2024-04-01 DIAGNOSIS — Z13.29 SCREENING FOR ENDOCRINE, METABOLIC AND IMMUNITY DISORDER: ICD-10-CM

## 2024-04-01 DIAGNOSIS — Z13.0 SCREENING FOR ENDOCRINE, METABOLIC AND IMMUNITY DISORDER: ICD-10-CM

## 2024-04-01 DIAGNOSIS — Z13.228 SCREENING FOR ENDOCRINE, METABOLIC AND IMMUNITY DISORDER: ICD-10-CM

## 2024-04-01 DIAGNOSIS — Z11.3 SCREENING EXAMINATION FOR SEXUALLY TRANSMITTED DISEASE: ICD-10-CM

## 2024-04-01 DIAGNOSIS — Z11.59 NEED FOR HEPATITIS C SCREENING TEST: ICD-10-CM

## 2024-04-01 LAB
25(OH)D3 SERPL-MCNC: 19 NG/ML (ref 30–100)
ALBUMIN SERPL BCP-MCNC: 4.5 G/DL (ref 3.2–4.9)
ALBUMIN/GLOB SERPL: 1.8 G/DL
ALP SERPL-CCNC: 63 U/L (ref 30–99)
ALT SERPL-CCNC: 19 U/L (ref 2–50)
ANION GAP SERPL CALC-SCNC: 10 MMOL/L (ref 7–16)
AST SERPL-CCNC: 21 U/L (ref 12–45)
BASOPHILS # BLD AUTO: 1.3 % (ref 0–1.8)
BASOPHILS # BLD: 0.08 K/UL (ref 0–0.12)
BILIRUB SERPL-MCNC: 0.3 MG/DL (ref 0.1–1.5)
BUN SERPL-MCNC: 14 MG/DL (ref 8–22)
CALCIUM ALBUM COR SERPL-MCNC: 9.3 MG/DL (ref 8.5–10.5)
CALCIUM SERPL-MCNC: 9.7 MG/DL (ref 8.5–10.5)
CHLORIDE SERPL-SCNC: 104 MMOL/L (ref 96–112)
CHOLEST SERPL-MCNC: 235 MG/DL (ref 100–199)
CO2 SERPL-SCNC: 25 MMOL/L (ref 20–33)
CREAT SERPL-MCNC: 1.24 MG/DL (ref 0.5–1.4)
EOSINOPHIL # BLD AUTO: 0.17 K/UL (ref 0–0.51)
EOSINOPHIL NFR BLD: 2.8 % (ref 0–6.9)
ERYTHROCYTE [DISTWIDTH] IN BLOOD BY AUTOMATED COUNT: 45.6 FL (ref 35.9–50)
EST. AVERAGE GLUCOSE BLD GHB EST-MCNC: 105 MG/DL
FASTING STATUS PATIENT QL REPORTED: NORMAL
GFR SERPLBLD CREATININE-BSD FMLA CKD-EPI: 74 ML/MIN/1.73 M 2
GLOBULIN SER CALC-MCNC: 2.5 G/DL (ref 1.9–3.5)
GLUCOSE SERPL-MCNC: 101 MG/DL (ref 65–99)
HBA1C MFR BLD: 5.3 % (ref 4–5.6)
HCT VFR BLD AUTO: 49.6 % (ref 42–52)
HCV AB SER QL: NORMAL
HDLC SERPL-MCNC: 51 MG/DL
HGB BLD-MCNC: 16.9 G/DL (ref 14–18)
HIV 1+2 AB+HIV1 P24 AG SERPL QL IA: NORMAL
IMM GRANULOCYTES # BLD AUTO: 0.01 K/UL (ref 0–0.11)
IMM GRANULOCYTES NFR BLD AUTO: 0.2 % (ref 0–0.9)
LDLC SERPL CALC-MCNC: 142 MG/DL
LYMPHOCYTES # BLD AUTO: 1.83 K/UL (ref 1–4.8)
LYMPHOCYTES NFR BLD: 30.4 % (ref 22–41)
MCH RBC QN AUTO: 31.8 PG (ref 27–33)
MCHC RBC AUTO-ENTMCNC: 34.1 G/DL (ref 32.3–36.5)
MCV RBC AUTO: 93.2 FL (ref 81.4–97.8)
MONOCYTES # BLD AUTO: 0.51 K/UL (ref 0–0.85)
MONOCYTES NFR BLD AUTO: 8.5 % (ref 0–13.4)
NEUTROPHILS # BLD AUTO: 3.41 K/UL (ref 1.82–7.42)
NEUTROPHILS NFR BLD: 56.8 % (ref 44–72)
NRBC # BLD AUTO: 0 K/UL
NRBC BLD-RTO: 0 /100 WBC (ref 0–0.2)
PLATELET # BLD AUTO: 227 K/UL (ref 164–446)
PMV BLD AUTO: 11 FL (ref 9–12.9)
POTASSIUM SERPL-SCNC: 4.8 MMOL/L (ref 3.6–5.5)
PROT SERPL-MCNC: 7 G/DL (ref 6–8.2)
RBC # BLD AUTO: 5.32 M/UL (ref 4.7–6.1)
SODIUM SERPL-SCNC: 139 MMOL/L (ref 135–145)
TRIGL SERPL-MCNC: 208 MG/DL (ref 0–149)
TSH SERPL DL<=0.005 MIU/L-ACNC: 2.22 UIU/ML (ref 0.38–5.33)
WBC # BLD AUTO: 6 K/UL (ref 4.8–10.8)

## 2024-04-01 PROCEDURE — 82306 VITAMIN D 25 HYDROXY: CPT

## 2024-04-01 PROCEDURE — 84443 ASSAY THYROID STIM HORMONE: CPT

## 2024-04-01 PROCEDURE — 80061 LIPID PANEL: CPT

## 2024-04-01 PROCEDURE — 87389 HIV-1 AG W/HIV-1&-2 AB AG IA: CPT

## 2024-04-01 PROCEDURE — 36415 COLL VENOUS BLD VENIPUNCTURE: CPT

## 2024-04-01 PROCEDURE — 86803 HEPATITIS C AB TEST: CPT

## 2024-04-01 PROCEDURE — 85025 COMPLETE CBC W/AUTO DIFF WBC: CPT

## 2024-04-01 PROCEDURE — 80053 COMPREHEN METABOLIC PANEL: CPT

## 2024-04-01 PROCEDURE — 83036 HEMOGLOBIN GLYCOSYLATED A1C: CPT

## 2024-04-03 DIAGNOSIS — E55.9 VITAMIN D DEFICIENCY: ICD-10-CM

## 2024-04-03 DIAGNOSIS — E78.5 DYSLIPIDEMIA: ICD-10-CM

## 2024-04-03 RX ORDER — ERGOCALCIFEROL 1.25 MG/1
50000 CAPSULE ORAL
Qty: 12 CAPSULE | Refills: 3 | Status: SHIPPED | OUTPATIENT
Start: 2024-04-03

## 2024-05-17 ENCOUNTER — APPOINTMENT (OUTPATIENT)
Dept: RADIOLOGY | Facility: MEDICAL CENTER | Age: 44
End: 2024-05-17
Attending: EMERGENCY MEDICINE
Payer: COMMERCIAL

## 2024-05-17 ENCOUNTER — HOSPITAL ENCOUNTER (EMERGENCY)
Facility: MEDICAL CENTER | Age: 44
End: 2024-05-17
Attending: EMERGENCY MEDICINE
Payer: COMMERCIAL

## 2024-05-17 VITALS
HEIGHT: 69 IN | OXYGEN SATURATION: 98 % | DIASTOLIC BLOOD PRESSURE: 72 MMHG | HEART RATE: 72 BPM | BODY MASS INDEX: 29.06 KG/M2 | WEIGHT: 196.21 LBS | RESPIRATION RATE: 16 BRPM | TEMPERATURE: 97 F | SYSTOLIC BLOOD PRESSURE: 121 MMHG

## 2024-05-17 DIAGNOSIS — S61.214A LACERATION OF RIGHT RING FINGER WITHOUT FOREIGN BODY WITHOUT DAMAGE TO NAIL, INITIAL ENCOUNTER: ICD-10-CM

## 2024-05-17 DIAGNOSIS — S62.639B OPEN FRACTURE OF TUFT OF DISTAL PHALANX OF FINGER: ICD-10-CM

## 2024-05-17 RX ORDER — HYDROCODONE BITARTRATE AND ACETAMINOPHEN 5; 325 MG/1; MG/1
1 TABLET ORAL EVERY 6 HOURS PRN
Qty: 12 TABLET | Refills: 0 | Status: SHIPPED | OUTPATIENT
Start: 2024-05-17 | End: 2024-05-20

## 2024-05-17 RX ORDER — HYDROCODONE BITARTRATE AND ACETAMINOPHEN 5; 325 MG/1; MG/1
1 TABLET ORAL ONCE
Status: COMPLETED | OUTPATIENT
Start: 2024-05-17 | End: 2024-05-17

## 2024-05-17 RX ORDER — CEPHALEXIN 500 MG/1
500 CAPSULE ORAL ONCE
Status: COMPLETED | OUTPATIENT
Start: 2024-05-17 | End: 2024-05-17

## 2024-05-17 RX ORDER — LIDOCAINE HYDROCHLORIDE 20 MG/ML
20 INJECTION, SOLUTION INFILTRATION; PERINEURAL ONCE
Status: COMPLETED | OUTPATIENT
Start: 2024-05-17 | End: 2024-05-17

## 2024-05-17 RX ORDER — CEPHALEXIN 500 MG/1
500 CAPSULE ORAL 4 TIMES DAILY
Qty: 28 CAPSULE | Refills: 0 | Status: ACTIVE | OUTPATIENT
Start: 2024-05-17 | End: 2024-05-24

## 2024-05-17 RX ORDER — ONDANSETRON 4 MG/1
4 TABLET, ORALLY DISINTEGRATING ORAL ONCE
Status: COMPLETED | OUTPATIENT
Start: 2024-05-17 | End: 2024-05-17

## 2024-05-17 RX ADMIN — LIDOCAINE HYDROCHLORIDE 20 ML: 20 INJECTION, SOLUTION INFILTRATION; PERINEURAL at 12:45

## 2024-05-17 RX ADMIN — HYDROCODONE BITARTRATE AND ACETAMINOPHEN 1 TABLET: 5; 325 TABLET ORAL at 12:26

## 2024-05-17 RX ADMIN — CEPHALEXIN 500 MG: 500 CAPSULE ORAL at 13:35

## 2024-05-17 RX ADMIN — ONDANSETRON 4 MG: 4 TABLET, ORALLY DISINTEGRATING ORAL at 12:26

## 2024-05-17 ASSESSMENT — FIBROSIS 4 INDEX: FIB4 SCORE: 0.89

## 2024-05-17 ASSESSMENT — PAIN DESCRIPTION - DESCRIPTORS: DESCRIPTORS: TENDER;THROBBING

## 2024-05-17 NOTE — ED NOTES
Pt discharged, all appropriate hospital equipment removed (IV, monitor, pulse ox, etc.). Pt left unit via walking with partner for home. Personal belongings with pt when leaving unit. Pt given discharge instructions prior to leaving ER, including where to  prescriptions and when to follow-up if applicable; verbalizes understanding. Pt informed to return to ED if symptoms worsen/return or altered status develop. Copy of discharge instructions signed and turned into DC basket and copy sent with pt. Referral to ortho surg, controlled substance sheet signed

## 2024-05-17 NOTE — ED TRIAGE NOTES
"Chief Complaint   Patient presents with    Digit Pain     Pt here for finger injury after smashing/cutting finger PTA while working on a  at home. Injury to R fourth finger, pt able to move finger but reports numbness to tip of finger      /82   Pulse 65   Temp 36.2 °C (97.1 °F) (Temporal)   Resp 18   Ht 1.753 m (5' 9\")   Wt 89 kg (196 lb 3.4 oz)   SpO2 96%   BMI 28.98 kg/m²     Pt here for above cc  Smashed/cut his R hand while working on  at home but injury only to R fourth finger  Arrived bandaged and this RN removed in triage to assess and re-bandaged as pt can move finger but reports numbness to the top  Almost circumferential lac to R fourth finger but bleeding controlled    Pt and wife to roe, educated on rooming process  "

## 2024-05-17 NOTE — ED PROVIDER NOTES
ED Provider Note    CHIEF COMPLAINT  Chief Complaint   Patient presents with    Digit Pain     Pt here for finger injury after smashing/cutting finger PTA while working on a  at home. Injury to R fourth finger, pt able to move finger but reports numbness to tip of finger          HPI/TERESA QUINN    Virgilio Hernandez is a 43 y.o. male who presents to the emergency department for crush injury to his right hand.  The patient was working with the brake system on a  in his right fourth finger was crushed.  He is a laceration and numbness to the distal tip of the right finger.  No other injuries.  Tetanus is up-to-date.    PAST MEDICAL HISTORY   has a past medical history of GERD without esophagitis (2/24/2016) and Kidney stone (01/2019).    SURGICAL HISTORY   has a past surgical history that includes cystoscopy stent placement (Right, 01/29/2019); ureteroscopy (Right, 01/29/2019); lasertripsy (Right, 01/29/2019); and vasectomy (2017).    FAMILY HISTORY  Family History   Problem Relation Age of Onset    Stroke Paternal Uncle     Dementia Maternal Grandmother     Hypertension Maternal Grandmother     Heart Disease Maternal Grandfather     Hypertension Maternal Grandfather     Lung Disease Maternal Grandfather     Dementia Paternal Grandmother     Diabetes Paternal Grandmother     Heart Disease Paternal Grandfather     Diabetes Paternal Grandfather     Cancer Neg Hx     Hyperlipidemia Neg Hx        SOCIAL HISTORY  Social History     Tobacco Use    Smoking status: Former     Current packs/day: 1.00     Average packs/day: 1 pack/day for 3.0 years (3.0 ttl pk-yrs)     Types: Cigarettes, Cigars    Smokeless tobacco: Current     Types: Chew   Vaping Use    Vaping status: Never Used   Substance and Sexual Activity    Alcohol use: Yes     Alcohol/week: 7.2 oz     Types: 12 Cans of beer per week    Drug use: No    Sexual activity: Yes     Partners: Female     Birth control/protection: Male Sterilization       CURRENT  "MEDICATIONS  Home Medications       Reviewed by Yuliya Hsieh R.N. (Registered Nurse) on 05/17/24 at 1147  Med List Status: Partial     Medication Last Dose Status   acetaminophen (TYLENOL) 325 MG Tab  Active   ergocalciferol (DRISDOL) 97894 UNIT capsule  Active   ibuprofen (MOTRIN) 400 MG Tab  Active                    ALLERGIES  No Known Allergies    PHYSICAL EXAM  VITAL SIGNS: /82   Pulse 65   Temp 36.2 °C (97.1 °F) (Temporal)   Resp 18   Ht 1.753 m (5' 9\")   Wt 89 kg (196 lb 3.4 oz)   SpO2 96%   BMI 28.98 kg/m²        Right hand is unremarkable except for the fourth finger.  He has a laceration that is near circumferential on the ulnar aspect of the finger.  The finger has intact but decreased sensation.  He does have perfusion.  There is slight deformity suggesting a fracture.      RADIOLOGY/PROCEDURES   I have independently interpreted the diagnostic imaging associated with this visit and am waiting the final reading from the radiologist.   My preliminary interpretation is as follows: I reviewed the x-ray and agree with radiology interpretation    Radiologist interpretation:  DX-HAND 3+ RIGHT   Final Result      Probable open fracture distal RIGHT 4th digit with comminuted and displaced fracture of the tuft and associated soft tissue injury/swelling.          Procedure note -laceration    The patient's right fourth finger has a large laceration.  It is cleaned extensively at the base and after several alcohol preps it is anesthetized with 2% lidocaine with epinephrine.  After that is copiously irrigated and prepped with Betadine and draped in a sterile fashion.    Patient's lacerations were approximated and closed with total of 6 interrupted 4-0 nylon sutures.    Procedure was tolerated well no complications.        COURSE & MEDICAL DECISION MAKING    ASSESSMENT, COURSE AND PLAN  Care Narrative:     Patient presents to the ED with laceration to his right fourth finger.  Lacerations " cleaned and closed as above.  He states his tetanus is up-to-date.  Splint is applied after the fact.  Splint is appropriate.  He is referred to orthopedics.  Put on 7 days of Keflex.  Return for signs of infection.  Sutures come out in 10 days here or his doctor.      Patient was not advised 10 days before any call and let him know 10 days.  He is referred to orthopedics for wound check.  Return for pain swelling redness fever or other concerns.  Questions were answered agreed with the plan.  Discharged in good condition.          DISPOSITION AND DISCUSSIONS    Dat Meeks M.D.  [address]  [address]  [facility] [address]  [phone]    Schedule an appointment as soon as possible for a visit in 3 days            FINAL DIAGNOSIS  1. Open fracture of tuft of distal phalanx of finger    2. Laceration of right ring finger without foreign body without damage to nail, initial encounter      Addendum: Length of laceration is 2 cm.    Electronically signed by: Sushil Garcia M.D., 5/17/2024 12:22 PM

## 2024-05-17 NOTE — DISCHARGE INSTRUCTIONS
Keep wound clean and dry.  Watch for signs of infection.  After 24 hours may take the dressing off and gently clean it.  Keep splint in place.  Take antibiotics as prescribed.  No driving on [place].  Follow-up with orthopedic doctor.

## 2024-05-28 ENCOUNTER — OFFICE VISIT (OUTPATIENT)
Dept: URGENT CARE | Facility: PHYSICIAN GROUP | Age: 44
End: 2024-05-28
Payer: COMMERCIAL

## 2024-05-28 VITALS
BODY MASS INDEX: 28.76 KG/M2 | RESPIRATION RATE: 14 BRPM | WEIGHT: 194.2 LBS | OXYGEN SATURATION: 97 % | HEART RATE: 75 BPM | DIASTOLIC BLOOD PRESSURE: 68 MMHG | HEIGHT: 69 IN | TEMPERATURE: 97.3 F | SYSTOLIC BLOOD PRESSURE: 100 MMHG

## 2024-05-28 DIAGNOSIS — Z48.02 VISIT FOR SUTURE REMOVAL: ICD-10-CM

## 2024-05-28 PROCEDURE — 3078F DIAST BP <80 MM HG: CPT | Performed by: STUDENT IN AN ORGANIZED HEALTH CARE EDUCATION/TRAINING PROGRAM

## 2024-05-28 PROCEDURE — 99212 OFFICE O/P EST SF 10 MIN: CPT | Mod: 25 | Performed by: STUDENT IN AN ORGANIZED HEALTH CARE EDUCATION/TRAINING PROGRAM

## 2024-05-28 PROCEDURE — 15853 REMOVAL SUTR/STAPL XREQ ANES: CPT | Performed by: STUDENT IN AN ORGANIZED HEALTH CARE EDUCATION/TRAINING PROGRAM

## 2024-05-28 PROCEDURE — 3074F SYST BP LT 130 MM HG: CPT | Performed by: STUDENT IN AN ORGANIZED HEALTH CARE EDUCATION/TRAINING PROGRAM

## 2024-05-28 ASSESSMENT — FIBROSIS 4 INDEX: FIB4 SCORE: 0.89

## 2024-05-28 NOTE — PROGRESS NOTES
Subjective:   Virgilio Hernandez is a 43 y.o. male who presents for Suture / Staple Removal (Follow up right ring finger, got stitches put in 05/17/2024, got told to get them taken after 10 days.)      HPI:  43-year-old male presents to urgent care for suture removal to the right ring finger.  Laceration 10 days ago on 5/17/2024.  No worsening symptoms.  He has not noticed any purulent discharge or increasing pain.  No failure of the sutures.  6 simple interrupted sutures placed.  Currently being followed by orthopedics and has follow-up visit in less than a week.    Medications:    acetaminophen Tabs  ergocalciferol  ibuprofen Tabs    Allergies: Patient has no known allergies.    Problem List: Virgilio Hernandez does not have any pertinent problems on file.    Surgical History:  Past Surgical History:   Procedure Laterality Date    CYSTOSCOPY STENT PLACEMENT Right 01/29/2019    Procedure: CYSTOSCOPY STENT PLACEMENT;  Surgeon: Alexis Sarabia M.D.;  [place] [facility] [place]  Service: Urology    URETEROSCOPY Right 01/29/2019    Procedure: URETEROSCOPY;  Surgeon: Alexis Sarabia M.D.;  [place] [facility] [place]  Service: Urology    LASERTRIPSY Right 01/29/2019    Procedure: LASERTRIPSY - LITHO;  Surgeon: Alexis Sarabia M.D.;  [place] [facility] [place]  Service: Urology    VASECTOMY  2017       Past Social Hx: Virgilio Hernandez  reports that he has quit smoking. His smoking use included cigarettes and cigars. He has a 3 pack-year smoking history. His smokeless tobacco use includes chew. He reports current alcohol use of about 7.2 oz of alcohol per week. He reports that he does not use drugs.     Past Family Hx:  Virgilio Hernandez family history includes Dementia in his maternal grandmother and paternal grandmother; Diabetes in his paternal grandfather and paternal grandmother; Heart Disease in his maternal grandfather and paternal grandfather; Hypertension in his maternal grandfather and  "maternal grandmother; Lung Disease in his maternal grandfather; Stroke in his paternal uncle.     Problem list, medications, and allergies reviewed by myself today in Epic.     Objective:     /68 (BP Location: Left arm, Patient Position: Sitting, BP Cuff Size: Adult long)   Pulse 75   Temp 36.3 °C (97.3 °F) (Temporal)   Resp 14   Ht 1.753 m (5' 9\")   Wt 88.1 kg (194 lb 3.2 oz)   SpO2 97%   BMI 28.68 kg/m²     Physical Exam  Musculoskeletal:      Comments: Laceration present to the right ring finger with 6 simple interrupted sutures in place.  No surrounding erythema or purulent discharge.  No dehiscence.         Assessment/Plan:     Diagnosis and associated orders:     1. Visit for suture removal           Comments/MDM:     6 simple interrupted sutures removed without complication.  No dehiscence.  Normal wound healing at this time.  No signs of infection.  Follow-up with orthopedics.         Differential diagnosis, natural history, supportive care, and indications for immediate follow-up discussed.    Advised the patient to follow-up with the primary care physician for recheck, reevaluation, and consideration of further management.    Please note that this dictation was created using voice recognition software. I have made a reasonable attempt to correct obvious errors, but I expect that there are errors of grammar and possibly content that I did not discover before finalizing the note.    Electronically signed by Dat Leo PA-C.      "

## 2024-07-23 ENCOUNTER — OFFICE VISIT (OUTPATIENT)
Dept: URGENT CARE | Facility: PHYSICIAN GROUP | Age: 44
End: 2024-07-23
Payer: COMMERCIAL

## 2024-07-23 ENCOUNTER — HOSPITAL ENCOUNTER (OUTPATIENT)
Dept: RADIOLOGY | Facility: MEDICAL CENTER | Age: 44
End: 2024-07-23
Payer: COMMERCIAL

## 2024-07-23 VITALS
DIASTOLIC BLOOD PRESSURE: 58 MMHG | OXYGEN SATURATION: 97 % | RESPIRATION RATE: 12 BRPM | HEART RATE: 64 BPM | WEIGHT: 192.4 LBS | HEIGHT: 69 IN | BODY MASS INDEX: 28.5 KG/M2 | SYSTOLIC BLOOD PRESSURE: 104 MMHG | TEMPERATURE: 97.8 F

## 2024-07-23 DIAGNOSIS — R10.84 GENERALIZED ABDOMINAL PAIN: ICD-10-CM

## 2024-07-23 DIAGNOSIS — R10.84 GENERALIZED ABDOMINAL PAIN: Primary | ICD-10-CM

## 2024-07-23 DIAGNOSIS — R11.0 NAUSEA: ICD-10-CM

## 2024-07-23 LAB
APPEARANCE UR: CLEAR
BILIRUB UR STRIP-MCNC: NEGATIVE MG/DL
COLOR UR AUTO: NORMAL
GLUCOSE UR STRIP.AUTO-MCNC: NEGATIVE MG/DL
KETONES UR STRIP.AUTO-MCNC: NEGATIVE MG/DL
LEUKOCYTE ESTERASE UR QL STRIP.AUTO: NEGATIVE
NITRITE UR QL STRIP.AUTO: NEGATIVE
PH UR STRIP.AUTO: 6 [PH] (ref 5–8)
PROT UR QL STRIP: NEGATIVE MG/DL
RBC UR QL AUTO: NEGATIVE
SP GR UR STRIP.AUTO: 1.03
UROBILINOGEN UR STRIP-MCNC: 0.2 MG/DL

## 2024-07-23 PROCEDURE — 99213 OFFICE O/P EST LOW 20 MIN: CPT

## 2024-07-23 PROCEDURE — 81002 URINALYSIS NONAUTO W/O SCOPE: CPT

## 2024-07-23 PROCEDURE — 3078F DIAST BP <80 MM HG: CPT

## 2024-07-23 PROCEDURE — 3074F SYST BP LT 130 MM HG: CPT

## 2024-07-23 PROCEDURE — 74176 CT ABD & PELVIS W/O CONTRAST: CPT

## 2024-07-23 RX ORDER — ONDANSETRON 4 MG/1
4 TABLET, FILM COATED ORAL EVERY 6 HOURS PRN
Qty: 15 TABLET | Refills: 0 | Status: SHIPPED | OUTPATIENT
Start: 2024-07-23 | End: 2024-07-27

## 2024-07-23 ASSESSMENT — ENCOUNTER SYMPTOMS
BLOOD IN STOOL: 0
WHEEZING: 0
PALPITATIONS: 0
NAUSEA: 1
NECK PAIN: 0
SHORTNESS OF BREATH: 0
DIARRHEA: 1
WEAKNESS: 0
DIZZINESS: 0
COUGH: 0
FEVER: 0
SPUTUM PRODUCTION: 0
HEADACHES: 0
VOMITING: 1
CHILLS: 0
BACK PAIN: 0
STRIDOR: 0
FLANK PAIN: 0

## 2024-07-23 ASSESSMENT — FIBROSIS 4 INDEX: FIB4 SCORE: 0.89

## 2024-07-23 ASSESSMENT — VISUAL ACUITY: OU: 1

## 2024-07-30 ENCOUNTER — HOSPITAL ENCOUNTER (EMERGENCY)
Facility: MEDICAL CENTER | Age: 44
End: 2024-07-30
Attending: EMERGENCY MEDICINE
Payer: COMMERCIAL

## 2024-07-30 VITALS
DIASTOLIC BLOOD PRESSURE: 76 MMHG | HEART RATE: 72 BPM | OXYGEN SATURATION: 92 % | BODY MASS INDEX: 28.41 KG/M2 | WEIGHT: 191.8 LBS | SYSTOLIC BLOOD PRESSURE: 123 MMHG | TEMPERATURE: 98.5 F | HEIGHT: 69 IN | RESPIRATION RATE: 16 BRPM

## 2024-07-30 DIAGNOSIS — R14.0 BLOATING: ICD-10-CM

## 2024-07-30 DIAGNOSIS — R10.9 ABDOMINAL DISCOMFORT: ICD-10-CM

## 2024-07-30 DIAGNOSIS — R11.0 NAUSEA: ICD-10-CM

## 2024-07-30 LAB
ALBUMIN SERPL BCP-MCNC: 4.4 G/DL (ref 3.2–4.9)
ALBUMIN/GLOB SERPL: 1.5 G/DL
ALP SERPL-CCNC: 76 U/L (ref 30–99)
ALT SERPL-CCNC: 26 U/L (ref 2–50)
ANION GAP SERPL CALC-SCNC: 13 MMOL/L (ref 7–16)
AST SERPL-CCNC: 23 U/L (ref 12–45)
BASOPHILS # BLD AUTO: 0.8 % (ref 0–1.8)
BASOPHILS # BLD: 0.07 K/UL (ref 0–0.12)
BILIRUB SERPL-MCNC: 0.4 MG/DL (ref 0.1–1.5)
BUN SERPL-MCNC: 18 MG/DL (ref 8–22)
CALCIUM ALBUM COR SERPL-MCNC: 10 MG/DL (ref 8.5–10.5)
CALCIUM SERPL-MCNC: 10.3 MG/DL (ref 8.5–10.5)
CHLORIDE SERPL-SCNC: 103 MMOL/L (ref 96–112)
CO2 SERPL-SCNC: 25 MMOL/L (ref 20–33)
CREAT SERPL-MCNC: 1.14 MG/DL (ref 0.5–1.4)
EOSINOPHIL # BLD AUTO: 0.18 K/UL (ref 0–0.51)
EOSINOPHIL NFR BLD: 2 % (ref 0–6.9)
ERYTHROCYTE [DISTWIDTH] IN BLOOD BY AUTOMATED COUNT: 45.3 FL (ref 35.9–50)
GFR SERPLBLD CREATININE-BSD FMLA CKD-EPI: 82 ML/MIN/1.73 M 2
GLOBULIN SER CALC-MCNC: 3 G/DL (ref 1.9–3.5)
GLUCOSE SERPL-MCNC: 102 MG/DL (ref 65–99)
HCT VFR BLD AUTO: 48.6 % (ref 42–52)
HGB BLD-MCNC: 16.9 G/DL (ref 14–18)
IMM GRANULOCYTES # BLD AUTO: 0.05 K/UL (ref 0–0.11)
IMM GRANULOCYTES NFR BLD AUTO: 0.6 % (ref 0–0.9)
LIPASE SERPL-CCNC: 42 U/L (ref 11–82)
LYMPHOCYTES # BLD AUTO: 2.15 K/UL (ref 1–4.8)
LYMPHOCYTES NFR BLD: 23.9 % (ref 22–41)
MCH RBC QN AUTO: 32.4 PG (ref 27–33)
MCHC RBC AUTO-ENTMCNC: 34.8 G/DL (ref 32.3–36.5)
MCV RBC AUTO: 93.1 FL (ref 81.4–97.8)
MONOCYTES # BLD AUTO: 0.64 K/UL (ref 0–0.85)
MONOCYTES NFR BLD AUTO: 7.1 % (ref 0–13.4)
NEUTROPHILS # BLD AUTO: 5.89 K/UL (ref 1.82–7.42)
NEUTROPHILS NFR BLD: 65.6 % (ref 44–72)
NRBC # BLD AUTO: 0 K/UL
NRBC BLD-RTO: 0 /100 WBC (ref 0–0.2)
PLATELET # BLD AUTO: 257 K/UL (ref 164–446)
PMV BLD AUTO: 9.9 FL (ref 9–12.9)
POTASSIUM SERPL-SCNC: 4.3 MMOL/L (ref 3.6–5.5)
PROT SERPL-MCNC: 7.4 G/DL (ref 6–8.2)
RBC # BLD AUTO: 5.22 M/UL (ref 4.7–6.1)
SODIUM SERPL-SCNC: 141 MMOL/L (ref 135–145)
WBC # BLD AUTO: 9 K/UL (ref 4.8–10.8)

## 2024-07-30 PROCEDURE — 80053 COMPREHEN METABOLIC PANEL: CPT

## 2024-07-30 PROCEDURE — 83690 ASSAY OF LIPASE: CPT

## 2024-07-30 PROCEDURE — 36415 COLL VENOUS BLD VENIPUNCTURE: CPT

## 2024-07-30 PROCEDURE — A9270 NON-COVERED ITEM OR SERVICE: HCPCS | Performed by: EMERGENCY MEDICINE

## 2024-07-30 PROCEDURE — 85025 COMPLETE CBC W/AUTO DIFF WBC: CPT

## 2024-07-30 PROCEDURE — 700102 HCHG RX REV CODE 250 W/ 637 OVERRIDE(OP): Performed by: EMERGENCY MEDICINE

## 2024-07-30 PROCEDURE — 99284 EMERGENCY DEPT VISIT MOD MDM: CPT

## 2024-07-30 RX ORDER — METOCLOPRAMIDE 10 MG/1
10 TABLET ORAL ONCE
Status: COMPLETED | OUTPATIENT
Start: 2024-07-30 | End: 2024-07-30

## 2024-07-30 RX ORDER — METOCLOPRAMIDE 5 MG/1
5 TABLET ORAL 4 TIMES DAILY
Qty: 20 TABLET | Refills: 0 | Status: SHIPPED | OUTPATIENT
Start: 2024-07-30 | End: 2024-08-04

## 2024-07-30 RX ORDER — DICYCLOMINE HCL 20 MG
20 TABLET ORAL EVERY 6 HOURS
Qty: 40 TABLET | Refills: 0 | Status: SHIPPED | OUTPATIENT
Start: 2024-07-30 | End: 2024-08-09

## 2024-07-30 RX ORDER — DICYCLOMINE HCL 20 MG
20 TABLET ORAL ONCE
Status: COMPLETED | OUTPATIENT
Start: 2024-07-30 | End: 2024-07-30

## 2024-07-30 RX ORDER — ALUMINA, MAGNESIA, AND SIMETHICONE 2400; 2400; 240 MG/30ML; MG/30ML; MG/30ML
10 SUSPENSION ORAL 4 TIMES DAILY PRN
Qty: 560 ML | Refills: 0 | Status: SHIPPED | OUTPATIENT
Start: 2024-07-30 | End: 2024-08-09

## 2024-07-30 RX ADMIN — DICYCLOMINE HYDROCHLORIDE 20 MG: 20 TABLET ORAL at 18:33

## 2024-07-30 RX ADMIN — METOCLOPRAMIDE 10 MG: 10 TABLET ORAL at 18:33

## 2024-07-30 ASSESSMENT — PAIN DESCRIPTION - PAIN TYPE: TYPE: ACUTE PAIN

## 2024-07-30 ASSESSMENT — FIBROSIS 4 INDEX: FIB4 SCORE: 0.89

## 2024-07-31 NOTE — ED TRIAGE NOTES
Virgilio Hernandez  43 y.o. male  Chief Complaint   Patient presents with    Abdominal Pain     For 2 weeks. Seen at  last. Urinalysis and CT abdomen negative. Denies fever dysuria.      Pt ambulatory to triage with steady gait for above complaint.     Pt is GCS 15, speaking in full sentences, follows commands and responds appropriately to questions. Resp are even and unlabored.     Abdominal pain protocol ordered. Pt placed in phlebotomy. Pt educated on triage process. Pt encouraged to alert staff for any changes.       Vitals:    07/30/24 1723   BP: 127/83   Pulse: 100   Resp: 15   Temp: 36.9 °C (98.4 °F)   SpO2: 94%

## 2024-07-31 NOTE — ED PROVIDER NOTES
ED Provider Note    CHIEF COMPLAINT  Chief Complaint   Patient presents with    Abdominal Pain     For 2 weeks. Seen at  last. Urinalysis and CT abdomen negative. Denies fever dysuria.      EXTERNAL RECORDS REVIEWED  Office visit from 7/23/2024 when the patient was seen for abdominal cramping and bloating of 1-1/2 weeks.  Prior to going any and had some emesis and diarrheal illness.  No recent travel, no medication changes or hospitalizations had exams that were reassuring.    ER visit from 5/17 when patient was seen for mechanical injury to his right fourth digit.    HPI/ROS  LIMITATION TO HISTORY   Select: : None  OUTSIDE HISTORIAN(S):  none    Virgilio Hernandez is a 43 y.o. male who presents to the emergency room for evaluation of worsening abdominal discomfort.  The patient over the last 2 weeks says that his abdominal discomfort which is mostly nauseousness and some cramping sensations are occasional bloating and intermittent constipation has occurred after he changed his diet substantially and having lots of meats and other processed food as well as wife was away.  Since he has stopped this he is having some resolution though has lingering amounts of pain and discomfort.  He has not had terrible amounts of burning sensations and does not have any regular burping or pinpoint pain in the upper portion of his abdomen and has no prior history of ulcers.  No recent fevers or chills, he is denying any other prior abdominal surgeries.  At the time my evaluation he is uncomfortable and mostly nauseous.  He has had some abdominal cramping and bloating sensations but denies any bloody bowel movements and no other recent travel or other concerning risk factors.    PAST MEDICAL HISTORY   has a past medical history of GERD without esophagitis (2/24/2016) and Kidney stone (01/2019).    SURGICAL HISTORY   has a past surgical history that includes cystoscopy stent placement (Right, 01/29/2019); ureteroscopy (Right,  "01/29/2019); lasertripsy (Right, 01/29/2019); and vasectomy (2017).    FAMILY HISTORY  Family History   Problem Relation Age of Onset    Stroke Paternal Uncle     Dementia Maternal Grandmother     Hypertension Maternal Grandmother     Heart Disease Maternal Grandfather     Hypertension Maternal Grandfather     Lung Disease Maternal Grandfather     Dementia Paternal Grandmother     Diabetes Paternal Grandmother     Heart Disease Paternal Grandfather     Diabetes Paternal Grandfather     Cancer Neg Hx     Hyperlipidemia Neg Hx        SOCIAL HISTORY  Social History     Tobacco Use    Smoking status: Former     Current packs/day: 1.00     Average packs/day: 1 pack/day for 3.0 years (3.0 ttl pk-yrs)     Types: Cigarettes, Cigars    Smokeless tobacco: Current     Types: Chew   Vaping Use    Vaping status: Never Used   Substance and Sexual Activity    Alcohol use: Yes     Alcohol/week: 7.2 oz     Types: 12 Cans of beer per week    Drug use: No    Sexual activity: Yes     Partners: Female     Birth control/protection: Male Sterilization       CURRENT MEDICATIONS  Home Medications       Reviewed by Haleigh Anderson R.N. (Registered Nurse) on 07/30/24 at 1730  Med List Status: Partial     Medication Last Dose Status   acetaminophen (TYLENOL) 325 MG Tab  Active   ergocalciferol (DRISDOL) 10596 UNIT capsule  Active   ibuprofen (MOTRIN) 400 MG Tab  Active                    ALLERGIES  No Known Allergies    PHYSICAL EXAM  VITAL SIGNS: /76   Pulse 72   Temp 36.9 °C (98.5 °F) (Temporal)   Resp 16   Ht 1.753 m (5' 9\")   Wt 87 kg (191 lb 12.8 oz)   SpO2 92%   BMI 28.32 kg/m²    Genl: M sitting in chair comfortably, speaking clearly, appears in no acute distress   Head: NC/AT   ENT: Mucous membranes moist, posterior pharynx clear, uvula midline, nares patent bilaterally   Pulmonary: Lungs are clear to auscultation bilaterally  Chest: No TTP  CV:  RRR, no murmur appreciated  Abdomen: soft, no true pinpoint pain, no skin " changes, no Cummins sign or McBurney's point tenderness. ND; no rebound/guarding, no masses palpated, no HSM   : no CVA or suprapubic tenderness   Musculoskeletal: Pain free ROM of the neck. Moving upper and lower extremities in spontaneous and coordinated fashion  Neuro: A&Ox4 (person, place, time, situation), speech fluent, gait steady, no focal deficits appreciated, No cerebellar signs. Sensation is grossly intact in the distal upper and lower extremities.  5/5 strength in  and dorsiflexion/plantar flexion of the ankles  Skin: No rash or lesions.  No pallor or jaundice.  No cyanosis.  Warm and dry.     EKG/LABS  Labs Reviewed   COMP METABOLIC PANEL - Abnormal; Notable for the following components:       Result Value    Glucose 102 (*)     All other components within normal limits   CBC WITH DIFFERENTIAL   LIPASE   ESTIMATED GFR     RADIOLOGY/PROCEDURES   none      COURSE & MEDICAL DECISION MAKING    ASSESSMENT, COURSE AND PLAN  Dietary changes, constipation, esophagitis, PUD, pancreatitis, gastritis, GERD, cholelithiasis, cholecystitis, choledocolithiasis    Care Narrative: Patient presents for symptoms as described above.  The patient is otherwise well-appearing, in no acute distress, does not have localizing tenderness or signs of progression to peritonitis.  Had an outpatient CT that was unremarkable for acute surgical process or other developing intra-abdominal masses.  His lab work obtained here for the differential as noted above shows no evidence of anemia, no leukocytosis, no concerning LFT abnormalities or hepatobiliary obstructive etiology.  I have discussed the patient's dietary changes, the likely etiologies of her nonemergent tach overall I would recommend giving him some breakthrough nausea medications, Bentyl and advised him about his diet and outpatient follow-up.  There is no evolving changes or localizing findings that would necessitate further advanced medical imaging and have a low  suspicion for vascular or infectious etiology.  Discharged home in stable condition.    DISPOSITION AND DISCUSSIONS  I have discussed management of the patient with the following physicians and ELIAZAR's:  none    Discussion of management with other QHP or appropriate source(s): None     Escalation of care considered, and ultimately not performed:diagnostic imaging    Barriers to care at this time, including but not limited to: none.     Decision tools and prescription drugs considered including, but not limited to:  bentyl .    FINAL DIAGNOSIS  1. Abdominal discomfort    2. Nausea    3. Bloating         Electronically signed by: Reno Ang M.D., 7/30/2024 6:05 PM

## 2024-07-31 NOTE — ED NOTES
Pt discharged to home. Pt provided education and discharge instructions per ERP. Pt ambulatory out of ED with steady gait.

## 2024-08-12 ENCOUNTER — HOSPITAL ENCOUNTER (EMERGENCY)
Facility: MEDICAL CENTER | Age: 44
End: 2024-08-12
Payer: COMMERCIAL

## 2024-08-12 ENCOUNTER — OFFICE VISIT (OUTPATIENT)
Dept: MEDICAL GROUP | Facility: PHYSICIAN GROUP | Age: 44
End: 2024-08-12
Payer: COMMERCIAL

## 2024-08-12 VITALS
WEIGHT: 190.7 LBS | RESPIRATION RATE: 17 BRPM | HEART RATE: 85 BPM | DIASTOLIC BLOOD PRESSURE: 73 MMHG | HEIGHT: 69 IN | OXYGEN SATURATION: 95 % | BODY MASS INDEX: 28.24 KG/M2 | SYSTOLIC BLOOD PRESSURE: 108 MMHG | TEMPERATURE: 97.6 F

## 2024-08-12 VITALS
OXYGEN SATURATION: 93 % | SYSTOLIC BLOOD PRESSURE: 106 MMHG | DIASTOLIC BLOOD PRESSURE: 78 MMHG | HEIGHT: 69 IN | HEART RATE: 85 BPM | BODY MASS INDEX: 28.14 KG/M2 | WEIGHT: 190 LBS | TEMPERATURE: 97 F

## 2024-08-12 DIAGNOSIS — N50.89 TESTICULAR SWELLING: ICD-10-CM

## 2024-08-12 DIAGNOSIS — R07.9 CHEST PAIN, UNSPECIFIED TYPE: ICD-10-CM

## 2024-08-12 DIAGNOSIS — R10.84 DIFFUSE ABDOMINAL PAIN: ICD-10-CM

## 2024-08-12 LAB
ALBUMIN SERPL BCP-MCNC: 4.4 G/DL (ref 3.2–4.9)
ALBUMIN/GLOB SERPL: 1.6 G/DL
ALP SERPL-CCNC: 72 U/L (ref 30–99)
ALT SERPL-CCNC: 21 U/L (ref 2–50)
ANION GAP SERPL CALC-SCNC: 12 MMOL/L (ref 7–16)
AST SERPL-CCNC: 26 U/L (ref 12–45)
BASOPHILS # BLD AUTO: 0.6 % (ref 0–1.8)
BASOPHILS # BLD: 0.05 K/UL (ref 0–0.12)
BILIRUB SERPL-MCNC: 0.4 MG/DL (ref 0.1–1.5)
BUN SERPL-MCNC: 21 MG/DL (ref 8–22)
CALCIUM ALBUM COR SERPL-MCNC: 9.3 MG/DL (ref 8.5–10.5)
CALCIUM SERPL-MCNC: 9.6 MG/DL (ref 8.5–10.5)
CHLORIDE SERPL-SCNC: 104 MMOL/L (ref 96–112)
CO2 SERPL-SCNC: 25 MMOL/L (ref 20–33)
CREAT SERPL-MCNC: 1.33 MG/DL (ref 0.5–1.4)
EKG IMPRESSION: NORMAL
EOSINOPHIL # BLD AUTO: 0.13 K/UL (ref 0–0.51)
EOSINOPHIL NFR BLD: 1.6 % (ref 0–6.9)
ERYTHROCYTE [DISTWIDTH] IN BLOOD BY AUTOMATED COUNT: 46 FL (ref 35.9–50)
GFR SERPLBLD CREATININE-BSD FMLA CKD-EPI: 68 ML/MIN/1.73 M 2
GLOBULIN SER CALC-MCNC: 2.7 G/DL (ref 1.9–3.5)
GLUCOSE SERPL-MCNC: 82 MG/DL (ref 65–99)
HCT VFR BLD AUTO: 49.9 % (ref 42–52)
HGB BLD-MCNC: 16.7 G/DL (ref 14–18)
IMM GRANULOCYTES # BLD AUTO: 0.02 K/UL (ref 0–0.11)
IMM GRANULOCYTES NFR BLD AUTO: 0.2 % (ref 0–0.9)
LIPASE SERPL-CCNC: 45 U/L (ref 11–82)
LYMPHOCYTES # BLD AUTO: 1.72 K/UL (ref 1–4.8)
LYMPHOCYTES NFR BLD: 21 % (ref 22–41)
MCH RBC QN AUTO: 31.9 PG (ref 27–33)
MCHC RBC AUTO-ENTMCNC: 33.5 G/DL (ref 32.3–36.5)
MCV RBC AUTO: 95.4 FL (ref 81.4–97.8)
MONOCYTES # BLD AUTO: 0.63 K/UL (ref 0–0.85)
MONOCYTES NFR BLD AUTO: 7.7 % (ref 0–13.4)
NEUTROPHILS # BLD AUTO: 5.66 K/UL (ref 1.82–7.42)
NEUTROPHILS NFR BLD: 68.9 % (ref 44–72)
NRBC # BLD AUTO: 0 K/UL
NRBC BLD-RTO: 0 /100 WBC (ref 0–0.2)
PLATELET # BLD AUTO: 218 K/UL (ref 164–446)
PMV BLD AUTO: 10.2 FL (ref 9–12.9)
POTASSIUM SERPL-SCNC: 4.5 MMOL/L (ref 3.6–5.5)
PROT SERPL-MCNC: 7.1 G/DL (ref 6–8.2)
RBC # BLD AUTO: 5.23 M/UL (ref 4.7–6.1)
SODIUM SERPL-SCNC: 141 MMOL/L (ref 135–145)
TROPONIN T SERPL-MCNC: <6 NG/L (ref 6–19)
WBC # BLD AUTO: 8.2 K/UL (ref 4.8–10.8)

## 2024-08-12 PROCEDURE — 84484 ASSAY OF TROPONIN QUANT: CPT

## 2024-08-12 PROCEDURE — 302449 STATCHG TRIAGE ONLY (STATISTIC)

## 2024-08-12 PROCEDURE — 3074F SYST BP LT 130 MM HG: CPT | Performed by: STUDENT IN AN ORGANIZED HEALTH CARE EDUCATION/TRAINING PROGRAM

## 2024-08-12 PROCEDURE — 83690 ASSAY OF LIPASE: CPT

## 2024-08-12 PROCEDURE — 99215 OFFICE O/P EST HI 40 MIN: CPT | Performed by: STUDENT IN AN ORGANIZED HEALTH CARE EDUCATION/TRAINING PROGRAM

## 2024-08-12 PROCEDURE — 3078F DIAST BP <80 MM HG: CPT | Performed by: STUDENT IN AN ORGANIZED HEALTH CARE EDUCATION/TRAINING PROGRAM

## 2024-08-12 PROCEDURE — 85025 COMPLETE CBC W/AUTO DIFF WBC: CPT

## 2024-08-12 PROCEDURE — 80053 COMPREHEN METABOLIC PANEL: CPT

## 2024-08-12 PROCEDURE — 93005 ELECTROCARDIOGRAM TRACING: CPT

## 2024-08-12 ASSESSMENT — PAIN DESCRIPTION - PAIN TYPE: TYPE: ACUTE PAIN

## 2024-08-12 ASSESSMENT — FIBROSIS 4 INDEX
FIB4 SCORE: 0.74
FIB4 SCORE: 0.74

## 2024-08-12 NOTE — PROGRESS NOTES
Subjective:   Verbal consent was acquired by the patient to use [employer] ambient listening note generation during this visit Yes     CC: Chest pain, abdominal pain, left groin pain    History of Present Illness  Mr. Hernandez is a pleasant 44 yo who presents today for chest and abdominal pain.     Three weeks ago, he experienced severe stomach upset, constipation, and hemorrhoids. Despite seeking medical attention, a CT scan ruled out kidney stones, despite a history of kidney stones. The pain, which he describes as more severe, extends from his back towards his kidneys. He also reports sharp pains in his upper chest. Despite receiving anti-nausea medication at an urgent care center, he was advised to visit the ER if the nausea persisted. His condition improved slightly, but the pain returned, prompting a visit to the ER. Blood and urine tests were conducted, all of which returned normal results. His abdominal pain is intermittent, with no identifiable alleviating factors. His appetite has decreased, eating only once a day. He denies vomiting but feels nauseous. His constipation has resolved, but he experienced diarrhea after the initial constipation. He occasionally takes Tums for acid reflux and ibuprofen regularly. He stopped taking Mylanta yesterday. Bentyl was prescribed, which he took every 6 hours for 10 days and Reglan 4 times a day for 5 days, which provided some relief. However, the nausea returned when the medication was discontinued. He denies any previous abdominal surgeries. His vitamin D was low and his cholesterol was high, but all other results were normal.    He frequently complains of lightheadedness and dizziness, which is causing him concern. He also reports pain radiating down his left leg, which initially started in his leg and then spread to his feet and then returning. He also experiences sharp pains in his genitals. He denies any shortness of breath or episodes of sweating, but did have a  "headache last night and seemed sweaty. He denies any pain during urination but feels chills. He also experiences pain in his leg and buttocks.    He also reports sharp pains in his upper chest, which he describes as feeling like his chest is on fire and compressed. He denies any pain radiating down his left arm. He describes the pain as deep in his chest. He reports that the chest pain has been severe and constant throughout the day today.    SOCIAL HISTORY  He has a history of smoking.    FAMILY HISTORY  He has a family history of heart issues on his father's side. He had a cousin that was only in his 50s that had a heart attack and .    ALLERGIES  He has no known allergies.      Health Maintenance: Not completed    ROS: Negative, except as noted above    Objective:     Exam:  /78 (BP Location: Left arm, Patient Position: Sitting, BP Cuff Size: Adult)   Pulse 85   Temp 36.1 °C (97 °F) (Temporal)   Ht 1.753 m (5' 9\")   Wt 86.2 kg (190 lb)   SpO2 93%   BMI 28.06 kg/m²  Body mass index is 28.06 kg/m².    Physical Exam  Constitutional:       Appearance: Normal appearance.   Cardiovascular:      Rate and Rhythm: Normal rate and regular rhythm.   Pulmonary:      Effort: Pulmonary effort is normal. No respiratory distress.      Breath sounds: Normal breath sounds. No stridor. No wheezing or rhonchi.   Abdominal:      General: Bowel sounds are normal. There is no distension.      Palpations: Abdomen is soft.      Tenderness: There is generalized abdominal tenderness and tenderness in the suprapubic area and left lower quadrant.   Neurological:      Mental Status: He is alert.         EKG Interpretation   Ordered and interpreted by Whitney Mccartney MD  Rhythm: normal sinus   Rate: normal   Axis: normal   Ectopy: none   Conduction: normal   ST Segments: Elevation noted in lead III  T Waves: no acute change   Q Waves: none   Clinical Impression: Elevated noted in lead III      Assessment & Plan:     43 y.o. male " with the following -     1. Chest pain, unspecified type  Acute, ongoing.  Patient presenting with chest pain that has been radiating from the left chest to the right.  Patient describes the pain as burning and tightening.  He states the pain has been constant throughout the day today.  In clinic EKG revealed slight elevation in lead III.  Due to patient's worsening chest pain patient was advised to go to the emergency department.  Transfer line was called and notified of the patient.  - EKG - Clinic Performed    2. Diffuse abdominal pain  Patient has had abdominal pain for the past 3 weeks that is now worsening.  He has had CT abdomen and labs completed with normal findings.  Patient's abdominal pain could possibly be secondary to H. pylori infection versus claudication.  CT abdomen pelvis was ordered however patient has been sent to the emergency department due to abnormal EKG.  Patient was provided with CTA abdomen order along with order for H. pylori breath test.  - HELICOBACTER PYLORI BREATH TEST; Future  - CTA ABDOMEN PELVIS W & W/O POST PROCESS; Future    3. Testicular swelling  Patient reports having bilateral testicular swelling.  Stat ultrasound order was provided however patient has been advised to go to the emergency department due to chest pain and abnormal EKG.  - [id number]; Future          I spent a total of 40 minutes with record review, exam, communication with the patient, communication with other providers, and documentation of this encounter.      Return if symptoms worsen or fail to improve.    Please note that this dictation was created using voice recognition software. I have made every reasonable attempt to correct obvious errors, but I expect that there are errors of grammar and possibly content that I did not discover before finalizing the note.

## 2024-08-12 NOTE — ED TRIAGE NOTES
Chief Complaint   Patient presents with    Chest Pain     CP, dizziness started today and MD did EKG and sent pt. Here.     Abdominal Pain     X 3 weeks, has been seen for this already without resolution of pain.  Had f/u with MD today about abd pain that has been going on.      Dizziness     Ambulated into triage with family for above.

## 2024-08-13 ENCOUNTER — HOSPITAL ENCOUNTER (OUTPATIENT)
Dept: RADIOLOGY | Facility: MEDICAL CENTER | Age: 44
End: 2024-08-13
Attending: STUDENT IN AN ORGANIZED HEALTH CARE EDUCATION/TRAINING PROGRAM
Payer: COMMERCIAL

## 2024-08-13 ENCOUNTER — OFFICE VISIT (OUTPATIENT)
Dept: MEDICAL GROUP | Facility: PHYSICIAN GROUP | Age: 44
End: 2024-08-13
Payer: COMMERCIAL

## 2024-08-13 ENCOUNTER — HOSPITAL ENCOUNTER (OUTPATIENT)
Dept: LAB | Facility: MEDICAL CENTER | Age: 44
End: 2024-08-13
Attending: STUDENT IN AN ORGANIZED HEALTH CARE EDUCATION/TRAINING PROGRAM
Payer: COMMERCIAL

## 2024-08-13 VITALS
DIASTOLIC BLOOD PRESSURE: 68 MMHG | HEIGHT: 69 IN | WEIGHT: 190.6 LBS | OXYGEN SATURATION: 97 % | HEART RATE: 70 BPM | RESPIRATION RATE: 16 BRPM | TEMPERATURE: 98.7 F | BODY MASS INDEX: 28.23 KG/M2 | SYSTOLIC BLOOD PRESSURE: 90 MMHG

## 2024-08-13 DIAGNOSIS — N50.89 TESTICULAR SWELLING: ICD-10-CM

## 2024-08-13 DIAGNOSIS — K21.9 GERD WITHOUT ESOPHAGITIS: ICD-10-CM

## 2024-08-13 DIAGNOSIS — R10.84 DIFFUSE ABDOMINAL PAIN: ICD-10-CM

## 2024-08-13 DIAGNOSIS — R11.0 NAUSEA: ICD-10-CM

## 2024-08-13 LAB — UREA BREATH TEST QL: NEGATIVE

## 2024-08-13 PROCEDURE — 76870 US EXAM SCROTUM: CPT

## 2024-08-13 PROCEDURE — 3078F DIAST BP <80 MM HG: CPT

## 2024-08-13 PROCEDURE — 83013 H PYLORI (C-13) BREATH: CPT

## 2024-08-13 PROCEDURE — 3074F SYST BP LT 130 MM HG: CPT

## 2024-08-13 PROCEDURE — 74174 CTA ABD&PLVS W/CONTRAST: CPT

## 2024-08-13 PROCEDURE — 99214 OFFICE O/P EST MOD 30 MIN: CPT

## 2024-08-13 PROCEDURE — 700117 HCHG RX CONTRAST REV CODE 255: Performed by: STUDENT IN AN ORGANIZED HEALTH CARE EDUCATION/TRAINING PROGRAM

## 2024-08-13 RX ORDER — HYDROCODONE BITARTRATE AND ACETAMINOPHEN 5; 325 MG/1; MG/1
1 TABLET ORAL
COMMUNITY
End: 2024-08-13

## 2024-08-13 RX ORDER — ONDANSETRON 4 MG/1
4 TABLET, FILM COATED ORAL EVERY 6 HOURS PRN
Qty: 10 TABLET | Refills: 0 | Status: SHIPPED | OUTPATIENT
Start: 2024-08-13 | End: 2024-08-23

## 2024-08-13 RX ORDER — CEPHALEXIN 500 MG/1
500 CAPSULE ORAL
COMMUNITY
End: 2024-08-13

## 2024-08-13 RX ADMIN — IOHEXOL 100 ML: 350 INJECTION, SOLUTION INTRAVENOUS at 14:48

## 2024-08-13 ASSESSMENT — FIBROSIS 4 INDEX: FIB4 SCORE: 1.09

## 2024-08-13 NOTE — ED NOTES
"Patient deciding to leave without being seen by Emergency Department Physician. Patient states that he was able to schedule an appointment for tomorrow.  Patient does not wish to wait any longer and understands that they can return at anytime for care should their symptoms return or worsen. Patient is Alert and Oriented x4; No Signs of Distress are noted at this time; Respirations are even and unlabored.  Patient will be discharged as \"Left Without being seen after triage\"    "

## 2024-08-13 NOTE — PROGRESS NOTES
"Verbal consent was acquired by the patient to use [employer] ambient listening note generation during this visit Yes     Subjective:     Chief Complaint   Patient presents with    Abdominal Pain     3 Weeks, went to  and was given medication for nausea. Patient states that he also went to the ED and was given medication for 10 days.      History of Present Illness  The patient presents for evaluation of abdominal pain.    He has been experiencing abdominal pain for the past 3 weeks. An EKG was performed due to severe chest pain accompanying his stomach pain. A breath test was also conducted. His testicles have been sore for some time. He was prescribed a 10-day course of Mylanta, which alleviated his nausea. However, once the effects of the medication wore off, he experienced severe nausea and severe headaches, prompting a visit to the ER.     Today, he reports feeling much better, albeit with minor stomach pain. Lab tests were conducted in the ER, all of which returned normal results. He denies any changes in pressure when coughing, vomiting, or the possibility of a hernia. Zofran and Reglan were prescribed during his first urgent care visit and during his ER visit. His bowel movements are soft, and he had diarrhea at the onset of his symptoms. He made dietary changes, which improved his symptoms. His appetite has decreased, and he has been eating light foods.     Yesterday, he experienced a burning sensation in his chest while in bed. He avoided taking Tums. Upon waking this morning, he experienced minor stomach cramps. He denies coughing up blood or having dark stools.    Allergies: Patient has no known allergies.    Health Maintenance: Completed   Objective:     BP 90/68 (BP Location: Left arm, Patient Position: Sitting, BP Cuff Size: Adult)   Pulse 70   Temp 37.1 °C (98.7 °F) (Temporal)   Resp 16   Ht 1.753 m (5' 9\")   Wt 86.5 kg (190 lb 9.6 oz)   SpO2 97%   BMI 28.15 kg/m²  Body mass index is 28.15 " kg/m².     Physical Exam  Vitals reviewed.   Constitutional:       General: He is not in acute distress.     Appearance: Normal appearance. He is not ill-appearing.   Cardiovascular:      Rate and Rhythm: Normal rate and regular rhythm.   Pulmonary:      Effort: Pulmonary effort is normal. No respiratory distress.   Abdominal:      General: Abdomen is flat. Bowel sounds are normal. There is no distension.      Palpations: Abdomen is soft. There is no mass.      Tenderness: There is generalized abdominal tenderness.      Hernia: No hernia is present.   Skin:     Coloration: Skin is not jaundiced or pale.   Neurological:      General: No focal deficit present.      Mental Status: He is alert and oriented to person, place, and time.   Psychiatric:         Mood and Affect: Mood normal.         Behavior: Behavior normal.         Thought Content: Thought content normal.         Judgment: Judgment normal.        Results  Laboratory Studies  H. pylori test was negative. Lipase was normal. Kidney and liver function were normal. Vitamin D was slightly low. Cholesterol was higher.    Imaging  Ultrasound showed no hydrocele or infection in the testicles, but epididymis was slightly enlarged. CT scan showed no abnormalities.    Results for orders placed or performed during the hospital encounter of 08/13/24   HELICOBACTER PYLORI BREATH TEST   Result Value Ref Range    H Pylori Breath Test Negative Negative        Assessment and Plan:     The following treatment plan was discussed through shared decision making with the patient:    1. Diffuse abdominal pain  Referral to Gastroenterology    ondansetron (ZOFRAN) 4 MG Tab tablet      2. GERD without esophagitis  Referral to Gastroenterology    omeprazole (PRILOSEC) 20 MG delayed-release capsule      3. Nausea  ondansetron (ZOFRAN) 4 MG Tab tablet        Assessment & Plan  The patient's recent H. pylori test returned negative, and an ultrasound did not reveal any hydrocele or  testicular abnormalities. The epididymis was slightly enlarged, likely due to pain and discomfort. A CT scan of the abdomen did not reveal any abnormalities. Lipase levels were normal, indicating no pancreatitis. Kidney and liver function were normal. The possibility of an ulcer causing irritation and nausea was discussed. An referral to Gastroenterology will be made for possible further investigation. Zofran will be prescribed for nausea, to be taken as needed, and Prilosec 20 mg daily will be started in the event this may be an ulcer.    Return in about 6 weeks (around 9/24/2024) for Med Check.         Please note that this note was created using dictation with voice recognition software. I have made every reasonable attempt to correct obvious errors, but I expect that there are errors of grammar and possibly content that I did not discover before finalizing the note.    JENNIFER Ferro  [facility]  [facility]

## 2025-06-25 ENCOUNTER — OFFICE VISIT (OUTPATIENT)
Dept: MEDICAL GROUP | Facility: PHYSICIAN GROUP | Age: 45
End: 2025-06-25
Payer: COMMERCIAL

## 2025-06-25 VITALS
BODY MASS INDEX: 29.23 KG/M2 | SYSTOLIC BLOOD PRESSURE: 100 MMHG | TEMPERATURE: 98 F | HEIGHT: 69 IN | WEIGHT: 197.38 LBS | OXYGEN SATURATION: 98 % | DIASTOLIC BLOOD PRESSURE: 70 MMHG | HEART RATE: 66 BPM | RESPIRATION RATE: 16 BRPM

## 2025-06-25 DIAGNOSIS — E78.5 DYSLIPIDEMIA: ICD-10-CM

## 2025-06-25 DIAGNOSIS — Z13.228 SCREENING FOR ENDOCRINE, METABOLIC AND IMMUNITY DISORDER: ICD-10-CM

## 2025-06-25 DIAGNOSIS — Z13.0 SCREENING FOR ENDOCRINE, METABOLIC AND IMMUNITY DISORDER: ICD-10-CM

## 2025-06-25 DIAGNOSIS — E55.9 VITAMIN D DEFICIENCY: ICD-10-CM

## 2025-06-25 DIAGNOSIS — Z13.29 SCREENING FOR ENDOCRINE, METABOLIC AND IMMUNITY DISORDER: ICD-10-CM

## 2025-06-25 DIAGNOSIS — R06.81 WITNESSED APNEIC SPELLS: ICD-10-CM

## 2025-06-25 DIAGNOSIS — R52 GENERALIZED BODY ACHES: ICD-10-CM

## 2025-06-25 DIAGNOSIS — Z91.89 AT RISK FOR OBSTRUCTIVE SLEEP APNEA: ICD-10-CM

## 2025-06-25 DIAGNOSIS — R42 LIGHTHEADED: Primary | ICD-10-CM

## 2025-06-25 DIAGNOSIS — R53.83 OTHER FATIGUE: ICD-10-CM

## 2025-06-25 DIAGNOSIS — R06.83 SNORING: ICD-10-CM

## 2025-06-25 PROCEDURE — 99214 OFFICE O/P EST MOD 30 MIN: CPT

## 2025-06-25 PROCEDURE — 3074F SYST BP LT 130 MM HG: CPT

## 2025-06-25 PROCEDURE — 3078F DIAST BP <80 MM HG: CPT

## 2025-06-25 RX ORDER — ERGOCALCIFEROL 1.25 MG/1
50000 CAPSULE ORAL
Qty: 12 CAPSULE | Refills: 3 | Status: SHIPPED | OUTPATIENT
Start: 2025-06-25

## 2025-06-25 ASSESSMENT — PATIENT HEALTH QUESTIONNAIRE - PHQ9: CLINICAL INTERPRETATION OF PHQ2 SCORE: 0

## 2025-06-25 ASSESSMENT — FIBROSIS 4 INDEX: FIB4 SCORE: 1.12

## 2025-06-25 NOTE — PROGRESS NOTES
Subjective:     Chief Complaint   Patient presents with    Medication Reaction     History of Present Illness  The patient is a 44-year-old male here following up on body aches and lightheadedness since stopping steroids.    He was previously prescribed steroids for a gastrointestinal issue that was causing heartburn and acid reflux. The treatment regimen involved a gradual tapering off of the medication over a period of 8 weeks, starting with 3 tablets, then reducing to 2, and finally to 1. During the tapering phase, he began experiencing severe body aches, particularly in his arms and legs, which have persisted. These symptoms have been ongoing for approximately 1 year. He has abstained from alcohol, reducing his intake from a six-pack daily to only 3 beers in the past 2 to 3 weeks. He has discontinued omeprazole as his acid reflux symptoms have resolved. He reports no current medication use.    He also reports persistent lightheadedness, particularly when standing up or bending over, despite maintaining adequate hydration with water and Gatorade. His blood pressure is typically on the lower side. He does not use a heart rate monitor and does not engage in regular exercise. He has a history of low heart rate.    He reports snoring, which worsens with alcohol consumption, and occasional episodes of apnea. He has gained weight since his last visit, with a previous weight range of 160 to 165 pounds. He has a family history of sleep apnea on both sides.    SOCIAL HISTORY  The patient has significantly reduced his alcohol consumption from a six-pack a day to approximately three beers in the past two to three weeks.    FAMILY HISTORY  The patient reports that sleep apnea runs in his family, with both grandparents having had it and using a mask.    Allergies: Patient has no known allergies.  ROS per HPI  Health Maintenance: Deferred     Objective:     /70 (BP Location: Left arm, Patient Position: Sitting, BP Cuff  "Size: Adult)   Pulse 66   Temp 36.7 °C (98 °F) (Temporal)   Resp 16   Ht 1.753 m (5' 9\")   Wt 89.5 kg (197 lb 6 oz)   SpO2 98%   BMI 29.15 kg/m²  Body mass index is 29.15 kg/m².     Physical Exam  Vitals reviewed.   Constitutional:       General: He is not in acute distress.     Appearance: Normal appearance. He is not ill-appearing.   Cardiovascular:      Rate and Rhythm: Normal rate and regular rhythm.      Heart sounds: Normal heart sounds.   Pulmonary:      Effort: Pulmonary effort is normal. No respiratory distress.      Breath sounds: Normal breath sounds.   Skin:     Coloration: Skin is not jaundiced or pale.   Neurological:      General: No focal deficit present.      Mental Status: He is alert and oriented to person, place, and time.   Psychiatric:         Mood and Affect: Mood normal.         Behavior: Behavior normal.         Thought Content: Thought content normal.         Judgment: Judgment normal.        STOPBANG - Sleep Apnea Screening      Flowsheet Row Most Recent Value   S - Have you been told that you SNORE? Yes   T - Are you often TIRED during the day? Yes   O - Do you know if you stop breathing or has anyone witnessed you stop breathing while you were asleep? (OBSTRUCTION) Yes   P - Do you have high blood PRESSURE or on medication to control high blood pressure? No   B - Is your Body Mass Index greater than 35? (BMI) No   A - Are you 50 years old or older? (AGE) No   N - Are you a male with a NECK circumference greater than 17 inches, or a female with a neck circumference greater than 16 inches? No   G - Are you male? (GENDER) Yes   STOPBANG Total Score 4   GRABIEL Risk Intermediate Risk      Assessment and Plan:     The following treatment plan was discussed through shared decision making with the patient:    1. Lightheaded  RIH ZIO PATCH MONITOR      2. Other fatigue  CBC WITH DIFFERENTIAL    VITAMIN D,25 HYDROXY (DEFICIENCY)    TSH    FREE THYROXINE    RIH ZIO PATCH MONITOR    Referral to " Pulmonary and Sleep Medicine      3. Snoring  Referral to Pulmonary and Sleep Medicine      4. At risk for obstructive sleep apnea  Referral to Pulmonary and Sleep Medicine      5. Dyslipidemia  Lipid Profile      6. Witnessed apneic spells  Referral to Pulmonary and Sleep Medicine      7. Generalized body aches        8. Vitamin D deficiency  VITAMIN D,25 HYDROXY (DEFICIENCY)    ergocalciferol (DRISDOL) 54940 UNIT capsule      9. Screening for endocrine, metabolic and immunity disorder  Comp Metabolic Panel    HEMOGLOBIN A1C    TSH    FREE THYROXINE        Assessment & Plan  1. Bodyaches.  - Body aches may be related to previous steroid use or undiagnosed sleep apnea.  - Magnesium malate recommended for muscle cramps and turmeric for inflammation and joint pain.  - Prescription for high-dose vitamin D to be taken once weekly provided.  - Fasting lab work to reassess cholesterol and vitamin D levels.    2. Lightheadedness.  - Lightheadedness could be related to low heart rate or potential sleep apnea.  - Zio patch ordered for 14 days to monitor heart rate.  - Cardiology will contact to set up the Zio patch.  - Will follow-up in approximately 1 to 2 months to go over lab results as well as Zio patch.  Patient does have lower resting heart rate in the lower normal range BP    3. Suspected sleep apnea.  - Intermediate risk for sleep apnea based on screening.  - Referral to sleep medicine made for further evaluation.      Return in about 2 months (around 8/25/2025) for Med Check, Labs.       Please note that this note was created using dictation with voice recognition software. I have made every reasonable attempt to correct obvious errors, but I expect that there are errors of grammar and possibly content that I did not discover before finalizing the note.    JENNIFER Ferro  [facility]  [facility]

## 2025-07-01 ENCOUNTER — TELEPHONE (OUTPATIENT)
Dept: HEALTH INFORMATION MANAGEMENT | Facility: OTHER | Age: 45
End: 2025-07-01
Payer: COMMERCIAL

## 2025-07-21 ENCOUNTER — OFFICE VISIT (OUTPATIENT)
Dept: SLEEP MEDICINE | Facility: MEDICAL CENTER | Age: 45
End: 2025-07-21
Attending: NURSE PRACTITIONER
Payer: COMMERCIAL

## 2025-07-21 VITALS
HEART RATE: 58 BPM | DIASTOLIC BLOOD PRESSURE: 60 MMHG | SYSTOLIC BLOOD PRESSURE: 100 MMHG | BODY MASS INDEX: 29.18 KG/M2 | OXYGEN SATURATION: 97 % | RESPIRATION RATE: 18 BRPM | HEIGHT: 69 IN | WEIGHT: 197 LBS

## 2025-07-21 DIAGNOSIS — G47.30 SLEEP DISORDER BREATHING: ICD-10-CM

## 2025-07-21 DIAGNOSIS — R06.83 SNORING: Primary | ICD-10-CM

## 2025-07-21 PROCEDURE — 3078F DIAST BP <80 MM HG: CPT | Performed by: NURSE PRACTITIONER

## 2025-07-21 PROCEDURE — 99213 OFFICE O/P EST LOW 20 MIN: CPT | Performed by: NURSE PRACTITIONER

## 2025-07-21 PROCEDURE — 3074F SYST BP LT 130 MM HG: CPT | Performed by: NURSE PRACTITIONER

## 2025-07-21 PROCEDURE — 99204 OFFICE O/P NEW MOD 45 MIN: CPT | Performed by: NURSE PRACTITIONER

## 2025-07-21 ASSESSMENT — FIBROSIS 4 INDEX: FIB4 SCORE: 1.12

## 2025-07-21 NOTE — PROGRESS NOTES
Chief Complaint   Patient presents with    New Patient     Ref by  EMIR Ferro Dx: R53.83   -Other fatigue  - Snoring   At risk for obstructive sleep apnea   - Witnessed apneic spells       HPI:  Virgilio Hernandez is a 44 y.o. year old male here today for initial consult for sleep medicine.  Kindly referred by JENNIFER Brink-PCP. He also reports persistent lightheadedness, particularly when standing up or bending over, despite maintaining adequate hydration with water and Gatorade. His blood pressure is typically on the lower side. He does not use a heart rate monitor and does not engage in regular exercise. He has a history of low heart rate.    He reports snoring, which worsens with alcohol consumption, and occasional episodes of apnea. He has gained weight since his last visit, with a previous weight range of 160 to 165 pounds. He has a family history of sleep apnea on both sides.  Patient also endorses occasional episodes of sleepwalking.  He states this has not happened in several months.    As per supplemental questionnaire to be scanned or imported into chart:    [place] Sleepiness Score: 5    Sleep Schedule  Bedtime: Weekday 2100 Weekend 2100  Wake time: Weekday 0400 Weekend 0400  Sleep-onset latency: 10m  Awakenings from sleep: 2-3  Difficulty falling back asleep: no  Bedroom partner: yes  Naps: No     DAYTIME SYMPTOMS:   Excessive daytime sleepiness: No   Daytime fatigue: Yes  Difficulty concentrating: Yes  Memory problems: Yes  Irritability:Yes  Work/school performance issues: Yes  Sleepiness with driving: No   Caffeine/stimulant use: Yes  Alcohol use:No     SLEEP RELATED SYMPTOMS  Snoring: Yes  Witnessed apnea or gasping/choking: Yes  Dry mouth or mouth breathing: No   Night Sweating: No   Teeth grinding/biting: Yes  Morning headaches: No   Refreshed Upon Awakening: No      SLEEP RELATED BEHAVIORS:  Parasomnias (walking, talking, eating, violence): Yes  Leg kicking: No   Restless legs -  "\"urge to move\": No   Nightmares: No  Recurrent: No   Dream enactment: No      NARCOLEPSY:  Cataplexy: No   Sleep paralysis: No   Sleep attacks: No   Hypnagogic/hypnopompic hallucinations: No      ROS: As per HPI and otherwise negative if not stated.    Past Medical History[1]    Past Surgical History[2]    Family History   Problem Relation Age of Onset    Stroke Paternal Uncle     Dementia Maternal Grandmother     Hypertension Maternal Grandmother     Heart Disease Maternal Grandfather     Hypertension Maternal Grandfather     Lung Disease Maternal Grandfather     Dementia Paternal Grandmother     Diabetes Paternal Grandmother     Heart Disease Paternal Grandfather     Diabetes Paternal Grandfather     Cancer Neg Hx     Hyperlipidemia Neg Hx        Allergies as of 07/21/2025    (No Known Allergies)        Vitals:  /60 (BP Location: Left arm, Patient Position: Sitting, BP Cuff Size: Adult)   Pulse (!) 58   Resp 18   Ht 1.753 m (5' 9\")   Wt 89.4 kg (197 lb)   SpO2 97%     Current medications as of today Current Medications[3]      Physical Exam:   Gen:           Alert and oriented, No apparent distress. Mood and affect appropriate, normal interaction with examiner.  Eyes:          PERRL, EOM intact, sclere white, conjunctive moist.  Ears:          Not examined.   Hearing:     Grossly intact.  Nose:          Normal, no lesions or deformities.  Dentition:    Good dentition.  Oropharynx:   Tongue normal, posterior pharynx without erythema or exudate.  Neck:        Supple, trachea midline, no masses.  Respiratory Effort: No intercostal retractions or use of accessory muscles.   Lung Auscultation:      Clear to auscultation bilaterally; no rales, rhonchi or wheezing.  CV:            Regular rate and rhythm. No murmurs, rubs or gallops.  Abd:           Not examined.   Lymphadenopathy: Not examined.  Gait and Station: Normal.  Digits and Nails: No clubbing, cyanosis, petechiae, or nodes.   Cranial Nerves: II-XII " grossly intact.  Skin:        No rashes, lesions or ulcers noted.               Ext:           No cyanosis or edema.      Assessment:  No diagnosis found.      Plan:   I reviewed with the patient the pathophysiology of obstructive sleep apnea, as well as potential cardiac and neurologic risks associated with untreated sleep apnea including CAD, HTN, pulmonary arterial hypertension, cardiac arrhythmias, heart attack or stroke.  GRABIEL patient's have increased risk of motor vehicle accidents, DM type II, chronic kidney disease and nonalcoholic liver disease.  He is cautioned against driving while sleepy for his safety and safety of others on the road. We reviewed treatment modalities for sleep apnea including CPAP/BiPAP therapy, ENT referral, dental appliance.      Symptoms compatible with GRABIEL include snoring, excessive daytime sleepiness and fatigue, poor sleep quality, and sleepwalking.  Will order home sleep study.  Patient advised to follow-up within 30 days.  Recommendations made based off findings.    Please call our office if you have any questions.    Thank you, [facility].  [phone]      Please note that this dictation was created using voice recognition software. I have made every reasonable attempt to correct obvious errors, but it is possible there are errors of grammar and possibly content that I did not discover before finalizing the note.    Answers submitted by the patient for this visit:  Sleep Center Questionnaire (Submitted on 7/20/2025)  Year of your last physical exam: 2025  Occupation :   Height: 5’9  Current weight: 195  What is the reason for your visit today?: Referred by physician  Have you ever been hospitalized?: No  Have you ever had problems with anesthesia?: No  Have you experienced post-operative delirium?: No  Any complications with surgery?: No  Please briefly describe your sleep problem and how old you were when it began.: Sleep walking-16 years old. Snoring around  19 years old.  How does this affect your daily life and activities? Please also rate how serious of a problem this is (1 = Not at all, 10 = Very Serious).: 5 or 6  Have you had any previous evaluations, examinations, or treatment for this sleep problem or any other problems with your sleep? If so, please describe the evaluation, treatment, and results.: No  Have you used any medications (prescribed or otherwise) to help your sleep problem? If yes, include name, amount, frequency, and the prescribing physician.: No  If employed, what time do you usually start and end work?: Start 4am-2pm  Do you ever change work shifts? If yes, describe how often (never, infrequently, regularly).: Never  What time do you usually go to bed and wake up on: Weekdays? Weekends?: Bedtime 8-9pm. Weekdays wake up 4am. Weekends wake up 5-8 am.  Do you have a regular bed partner?: Yes  How many minutes does it usually take to fall asleep at night after turning off the lights?: 0-5min  What do you ordinarily do just prior to turning out the lights and attempting to go to sleep (e.g., reading, TV, baths, etc.)?: Watch TV  On average, how many times do you wake up during the night?: 1-3  On average, how many times do you wake up to use the bathroom?: 0-1  Do you often wake up too early in the morning and are unable to return to sleep?: Yes  On average, how many hours of sleep do you get per night?: 7  How do you usually awaken?  Alarm, spontaneously, or other?: Alarm and spontaneous  Is it difficult for you to awaken and get out of bed after sleeping? (Not at all, Sometimes, Very): Sometimes  Do you nap or return to bed after arising?: No  Are you bothered by sleepiness during the day?: No  Do you feel that you get too much sleep at night?: No  Do you feel that you get too little sleep at night?: No  Do you usually feel tired during the day? If so, what do you attribute this to?: Yes, work is physical and outside  Do you find yourself falling  "asleep when you don't mean to? : No  Have you ever suddenly fallen?: No  Have you ever experienced sudden body weakness?: No  Have you ever experienced weakness or paralysis upon going to sleep?: No  Have you ever experienced weakness or paralysis upon awakening from sleep?: No  Have you ever experienced seeing things or hearing voices/noises: That weren't real? On going to sleep? During the night? On awakening from sleep? During the day?: No  Do you have difficulty breathing at night? If yes, briefly describe.: No  Have you been told you snore while asleep? If so, does it disturb a bed partner (or someone in the same room), or someone in the next room?: Yes and Yes  Have you ever experienced doing something without being aware of the action? If yes, please describe.: Yes  How many times per week does this occur?: Can go weeks/months without episodes at times  Have you ever experienced upon lying in bed before sleep or on awakening from sleep: Restlessness of legs, \"nervous legs\", \"creeping crawling\" sensation of legs, or twitching of legs?: No  Have you ever been told that your arms or legs jerk or twitch while you are asleep? If yes, how many times per night does this occur?: No  Do you know, or have you ever been told that you do any of the following while sleeping: talk, walk, grit teeth, wet the bed, wake up screaming or seemingly afraid, have disturbing dreams, have unusual movements, wake up with headaches, (males) have erections? If yes to any of these, please indicate how many times per week, age started, last occurrence, treatment received.: Talk and walk, started in teen years, frequency maybe monthly  Has anyone in your family been known to have any sleep problems? If yes, please list the type of problem (e.g., trouble getting to sleep, too sleepy, bed wetting, etc.), the relationship of this person to you, and the treatment received.: Mother and maternal and paternal grandfather’s had sleep apnea     "     [1]   Past Medical History:  Diagnosis Date    GERD without esophagitis 2/24/2016    Kidney stone 01/2019   [2]   Past Surgical History:  Procedure Laterality Date    CYSTOSCOPY STENT PLACEMENT Right 01/29/2019    Procedure: CYSTOSCOPY STENT PLACEMENT;  Surgeon: Alexis Sarabia M.D.;  Location: SURGERY [facility];  Service: Urology    URETEROSCOPY Right 01/29/2019    Procedure: URETEROSCOPY;  Surgeon: Alexis Sarabia M.D.;  Location: SURGERY [facility];  Service: Urology    LASERTRIPSY Right 01/29/2019    Procedure: LASERTRIPSY - LITHO;  Surgeon: Alexis Sarabia M.D.;  Location: SURGERY [facility];  Service: Urology    VASECTOMY  2017   [3]   Current Outpatient Medications   Medication Sig Dispense Refill    ergocalciferol (DRISDOL) 73491 UNIT capsule Take 1 Capsule by mouth every 7 days. 12 Capsule 3     No current facility-administered medications for this visit.

## 2025-07-25 ENCOUNTER — APPOINTMENT (OUTPATIENT)
Dept: SLEEP MEDICINE | Facility: MEDICAL CENTER | Age: 45
End: 2025-07-25
Attending: NURSE PRACTITIONER
Payer: COMMERCIAL

## 2025-07-29 ENCOUNTER — NON-PROVIDER VISIT (OUTPATIENT)
Dept: CARDIOLOGY | Facility: MEDICAL CENTER | Age: 45
End: 2025-07-29
Payer: COMMERCIAL

## 2025-07-29 DIAGNOSIS — R42 LIGHTHEADED: ICD-10-CM

## 2025-07-29 DIAGNOSIS — R53.83 OTHER FATIGUE: ICD-10-CM

## 2025-07-29 NOTE — PROGRESS NOTES
Home enrollment completed in the 14 day Zio Holter monitor per JENNIFER Brink.  Monitor will be shipped to patient via [employer]  Pending EOS

## 2025-07-31 ENCOUNTER — APPOINTMENT (OUTPATIENT)
Dept: SLEEP MEDICINE | Facility: MEDICAL CENTER | Age: 45
End: 2025-07-31
Attending: NURSE PRACTITIONER
Payer: COMMERCIAL

## 2025-08-11 ENCOUNTER — OFFICE VISIT (OUTPATIENT)
Dept: SLEEP MEDICINE | Facility: MEDICAL CENTER | Age: 45
End: 2025-08-11
Attending: NURSE PRACTITIONER
Payer: COMMERCIAL

## 2025-08-11 VITALS
DIASTOLIC BLOOD PRESSURE: 78 MMHG | BODY MASS INDEX: 28.9 KG/M2 | OXYGEN SATURATION: 95 % | RESPIRATION RATE: 14 BRPM | WEIGHT: 195.1 LBS | HEART RATE: 79 BPM | SYSTOLIC BLOOD PRESSURE: 118 MMHG | HEIGHT: 69 IN

## 2025-08-11 DIAGNOSIS — G47.33 OSA (OBSTRUCTIVE SLEEP APNEA): Primary | ICD-10-CM

## 2025-08-11 DIAGNOSIS — R06.83 SNORING: ICD-10-CM

## 2025-08-11 PROCEDURE — 99213 OFFICE O/P EST LOW 20 MIN: CPT | Performed by: NURSE PRACTITIONER

## 2025-08-11 PROCEDURE — 99214 OFFICE O/P EST MOD 30 MIN: CPT | Performed by: NURSE PRACTITIONER

## 2025-08-11 PROCEDURE — 3074F SYST BP LT 130 MM HG: CPT | Performed by: NURSE PRACTITIONER

## 2025-08-11 PROCEDURE — 3078F DIAST BP <80 MM HG: CPT | Performed by: NURSE PRACTITIONER

## 2025-08-11 ASSESSMENT — FIBROSIS 4 INDEX: FIB4 SCORE: 1.12

## 2025-08-25 ENCOUNTER — TELEPHONE (OUTPATIENT)
Dept: CARDIOLOGY | Facility: MEDICAL CENTER | Age: 45
End: 2025-08-25
Payer: COMMERCIAL

## (undated) DEVICE — SODIUM CHL. IRRIGATION 0.9% 3000ML (4EA/CA 65CA/PF)

## (undated) DEVICE — SYRINGE 10 ML CONTROL LL (25EA/BX 4BX/CA)

## (undated) DEVICE — ELECTRODE 850 FOAM ADHESIVE - HYDROGEL RADIOTRNSPRNT (50/PK)

## (undated) DEVICE — GLOVE BIOGEL PI ORTHO SZ 6 1/2 SURGICAL PF LF (40PR/BX)

## (undated) DEVICE — KIT ANESTHESIA W/CIRCUIT & 3/LT BAG W/FILTER (20EA/CA)

## (undated) DEVICE — GLOVE, BIOGEL ECLIPSE, SZ 7.0, PF LTX (50/BX)

## (undated) DEVICE — WIRE GUIDE SENSOR DUAL FLEX - 5/BX

## (undated) DEVICE — JELLY, KY 2 0Z STERILE

## (undated) DEVICE — PROTECTOR ULNA NERVE - (36PR/CA)

## (undated) DEVICE — LACTATED RINGERS INJ 1000 ML - (14EA/CA 60CA/PF)

## (undated) DEVICE — CONTAINER SPECIMEN BAG OR - STERILE 4 OZ W/LID (100EA/CA)

## (undated) DEVICE — GOWN WARMING STANDARD FLEX - (30/CA)

## (undated) DEVICE — NEPTUNE 4 PORT MANIFOLD - (20/PK)

## (undated) DEVICE — CONNECTOR HOSE NEPTUNE FOR CYSTO ROOM

## (undated) DEVICE — TOWELS CLOTH SURGICAL - (4/PK 20PK/CA)

## (undated) DEVICE — BAG URODRAIN WITH TUBING - (20/CA)

## (undated) DEVICE — WATER IRRIG. STER 3000 ML - (4/CA)

## (undated) DEVICE — SPONGE GAUZESTER 4 X 4 4PLY - (128PK/CA)

## (undated) DEVICE — MASK, LARYNGEAL AIRWAY #4

## (undated) DEVICE — TUBING CLEARLINK DUO-VENT - C-FLO (48EA/CA)

## (undated) DEVICE — LASER FIBER HOLM 365 MICRON 100 WATT (5EA/BX)

## (undated) DEVICE — GOWN SURGICAL X-LARGE ULTRA - FILM-REINFORCED (20/CA)

## (undated) DEVICE — GLOVE BIOGEL SZ 7.5 SURGICAL PF LTX - (50PR/BX 4BX/CA)

## (undated) DEVICE — KIT ROOM DECONTAMINATION

## (undated) DEVICE — SET EXTENSION WITH 2 PORTS (48EA/CA) ***PART #2C8610 IS A SUBSTITUTE*****

## (undated) DEVICE — WATER IRRIG. STER. 1500 ML - (9/CA)

## (undated) DEVICE — TUBE CONNECT SUCTION CLEAR 120 X 1/4" (50EA/CA)"

## (undated) DEVICE — COVER FOOT UNIVERSAL DISP. - (25EA/CA)

## (undated) DEVICE — SLEEVE, VASO, THIGH, MED

## (undated) DEVICE — GOWN SURGEONS X-LARGE - DISP. (30/CA)

## (undated) DEVICE — DILATOR NOTTINGHAM 6F-10FRX70CM

## (undated) DEVICE — CATHETER FOLEY ROBINSON 10FR 16IN STRL (12EA/CA)

## (undated) DEVICE — PACK CYSTOSCOPY III - (8/CA)

## (undated) DEVICE — SET IRRIGATION CYSTOSCOPY Y-TYPE L81 IN (20EA/CA)

## (undated) DEVICE — SENSOR SPO2 NEO LNCS ADHESIVE (20/BX) SEE USER NOTES

## (undated) DEVICE — GLOVE BIOGEL PI INDICATOR SZ 7.0 SURGICAL PF LF - (50/BX 4BX/CA)

## (undated) DEVICE — MASK ANESTHESIA ADULT  - (100/CA)

## (undated) DEVICE — SUCTION INSTRUMENT YANKAUER BULBOUS TIP W/O VENT (50EA/CA)

## (undated) DEVICE — SET LEADWIRE 5 LEAD BEDSIDE DISPOSABLE ECG (1SET OF 5/EA)

## (undated) DEVICE — CATHETER URETHRAL OPEN END AXXCESS (10EA/BX)

## (undated) DEVICE — HEAD HOLDER JUNIOR/ADULT